# Patient Record
Sex: FEMALE | Race: OTHER | NOT HISPANIC OR LATINO
[De-identification: names, ages, dates, MRNs, and addresses within clinical notes are randomized per-mention and may not be internally consistent; named-entity substitution may affect disease eponyms.]

---

## 2020-06-01 PROBLEM — Z00.00 ENCOUNTER FOR PREVENTIVE HEALTH EXAMINATION: Status: ACTIVE | Noted: 2020-06-01

## 2020-06-04 ENCOUNTER — APPOINTMENT (OUTPATIENT)
Dept: ENDOCRINOLOGY | Facility: CLINIC | Age: 65
End: 2020-06-04
Payer: COMMERCIAL

## 2020-06-04 DIAGNOSIS — Z82.49 FAMILY HISTORY OF ISCHEMIC HEART DISEASE AND OTHER DISEASES OF THE CIRCULATORY SYSTEM: ICD-10-CM

## 2020-06-04 DIAGNOSIS — A07.8 OTHER SPECIFIED PROTOZOAL INTESTINAL DISEASES: ICD-10-CM

## 2020-06-04 DIAGNOSIS — A90 DENGUE FEVER [CLASSICAL DENGUE]: ICD-10-CM

## 2020-06-04 DIAGNOSIS — E80.6 OTHER DISORDERS OF BILIRUBIN METABOLISM: ICD-10-CM

## 2020-06-04 DIAGNOSIS — J98.4 OTHER DISORDERS OF LUNG: ICD-10-CM

## 2020-06-04 PROCEDURE — 99205 OFFICE O/P NEW HI 60 MIN: CPT | Mod: 95

## 2020-06-04 NOTE — HISTORY OF PRESENT ILLNESS
[Home] : at home, [unfilled] , at the time of the visit. [Medical Office: ([facility])___] : at the medical office located in  [Verbal consent obtained from patient] : the patient, [unfilled] [FreeTextEntry1] : Ms. 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

## 2020-06-04 NOTE — CONSULT LETTER
[Dear  ___] : Dear  [unfilled], [Consult Letter:] : I had the pleasure of evaluating your patient, [unfilled]. [Please see my note below.] : Please see my note below. [Consult Closing:] : Thank you very much for allowing me to participate in the care of this patient.  If you have any questions, please do not hesitate to contact me. [Sincerely,] : Sincerely, [FreeTextEntry3] : Dhara Castellanos MD

## 2020-06-04 NOTE — REVIEW OF SYSTEMS
[Recent Weight Gain (___ Lbs)] : recent weight gain: [unfilled] lbs [Negative] : Heme/Lymph [Joint Pain] : joint pain [Back Pain] : back pain [Fatigue] : no fatigue

## 2020-06-26 LAB
ACTH SER-ACNC: 12.9 PG/ML
ALBUMIN SERPL ELPH-MCNC: 4.3 G/DL
ALP BLD-CCNC: 58 U/L
ALPHA SUBUNIT SERPL-MCNC: 0.48 NG/ML
ALT SERPL-CCNC: 69 U/L
ANION GAP SERPL CALC-SCNC: 12 MMOL/L
AST SERPL-CCNC: 60 U/L
BASOPHILS # BLD AUTO: 0.07 K/UL
BASOPHILS NFR BLD AUTO: 1 %
BILIRUB SERPL-MCNC: 0.3 MG/DL
BUN SERPL-MCNC: 18 MG/DL
CALCIUM SERPL-MCNC: 9.6 MG/DL
CHLORIDE SERPL-SCNC: 100 MMOL/L
CO2 SERPL-SCNC: 26 MMOL/L
CORTIS SERPL-MCNC: 6.3 UG/DL
CREAT SERPL-MCNC: 0.67 MG/DL
EOSINOPHIL # BLD AUTO: 0.49 K/UL
EOSINOPHIL NFR BLD AUTO: 7.2 %
ESTRADIOL SERPL-MCNC: <5 PG/ML
FERRITIN SERPL-MCNC: 39 NG/ML
FSH SERPL-MCNC: 61 IU/L
GH SERPL-MCNC: 2.2 NG/ML
GLUCOSE SERPL-MCNC: 102 MG/DL
HCT VFR BLD CALC: 40.5 %
HGB BLD-MCNC: 12.2 G/DL
IGF BP1 SERPL-MCNC: 65 NG/ML
IMM GRANULOCYTES NFR BLD AUTO: 0.3 %
IRON SATN MFR SERPL: 29 %
IRON SERPL-MCNC: 90 UG/DL
LH SERPL-ACNC: 25.2 IU/L
LYMPHOCYTES # BLD AUTO: 1.9 K/UL
LYMPHOCYTES NFR BLD AUTO: 28 %
MAN DIFF?: NORMAL
MCHC RBC-ENTMCNC: 29.3 PG
MCHC RBC-ENTMCNC: 30.1 GM/DL
MCV RBC AUTO: 97.4 FL
MONOCYTES # BLD AUTO: 0.7 K/UL
MONOCYTES NFR BLD AUTO: 10.3 %
NEUTROPHILS # BLD AUTO: 3.6 K/UL
NEUTROPHILS NFR BLD AUTO: 53.2 %
PLATELET # BLD AUTO: 237 K/UL
POTASSIUM SERPL-SCNC: 4.7 MMOL/L
PROLACTIN SERPL-MCNC: 7.5 NG/ML
PROT SERPL-MCNC: 7.4 G/DL
RBC # BLD: 4.16 M/UL
RBC # FLD: 13 %
SODIUM SERPL-SCNC: 138 MMOL/L
T4 FREE SERPL-MCNC: 1.2 NG/DL
TIBC SERPL-MCNC: 313 UG/DL
TSH SERPL-ACNC: 1.42 UIU/ML
UIBC SERPL-MCNC: 222 UG/DL
WBC # FLD AUTO: 6.78 K/UL

## 2020-06-29 ENCOUNTER — APPOINTMENT (OUTPATIENT)
Dept: ENDOCRINOLOGY | Facility: CLINIC | Age: 65
End: 2020-06-29
Payer: COMMERCIAL

## 2020-06-29 PROCEDURE — 99213 OFFICE O/P EST LOW 20 MIN: CPT | Mod: 95

## 2020-06-29 NOTE — REVIEW OF SYSTEMS
[Back Pain] : back pain [Recent Weight Gain (___ Lbs)] : recent weight gain: [unfilled] lbs [Joint Pain] : joint pain [Negative] : Heme/Lymph [Fatigue] : no fatigue

## 2020-06-29 NOTE — HISTORY OF PRESENT ILLNESS
[Verbal consent obtained from patient] : the patient, [unfilled] [Home] : at home, [unfilled] , at the time of the visit. [Medical Office: ([facility])___] : at the medical office located in  [FreeTextEntry1] : Ms. 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

## 2020-06-29 NOTE — REVIEW OF SYSTEMS
[Recent Weight Gain (___ Lbs)] : recent weight gain: [unfilled] lbs [Back Pain] : back pain [Joint Pain] : joint pain [Negative] : Heme/Lymph [Fatigue] : no fatigue

## 2020-06-29 NOTE — HISTORY OF PRESENT ILLNESS
[Verbal consent obtained from patient] : the patient, [unfilled] [Home] : at home, [unfilled] , at the time of the visit. [Medical Office: ([facility])___] : at the medical office located in  [FreeTextEntry1] : Ms. 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

## 2020-07-01 VITALS — WEIGHT: 125 LBS | BODY MASS INDEX: 22.15 KG/M2 | HEIGHT: 63 IN

## 2020-07-06 ENCOUNTER — RESULT REVIEW (OUTPATIENT)
Age: 65
End: 2020-07-06

## 2020-07-08 ENCOUNTER — RESULT REVIEW (OUTPATIENT)
Age: 65
End: 2020-07-08

## 2020-07-30 ENCOUNTER — APPOINTMENT (OUTPATIENT)
Dept: ENDOCRINOLOGY | Facility: CLINIC | Age: 65
End: 2020-07-30
Payer: COMMERCIAL

## 2020-07-30 PROCEDURE — 99213 OFFICE O/P EST LOW 20 MIN: CPT | Mod: 95

## 2020-07-30 NOTE — REASON FOR VISIT
[Follow - Up] : a follow-up visit [Hypercalcemia] : hypercalcemia [Hyperparathyroidism] : hyperparathyroidism

## 2020-07-30 NOTE — HISTORY OF PRESENT ILLNESS
[Home] : at home, [unfilled] , at the time of the visit. [Medical Office: ([facility])___] : at the medical office located in  [Verbal consent obtained from patient] : the patient, [unfilled] [FreeTextEntry1] : Ms. 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

## 2020-08-04 ENCOUNTER — APPOINTMENT (OUTPATIENT)
Dept: HEMATOLOGY ONCOLOGY | Facility: CLINIC | Age: 65
End: 2020-08-04
Payer: COMMERCIAL

## 2020-08-04 ENCOUNTER — RESULT REVIEW (OUTPATIENT)
Age: 65
End: 2020-08-04

## 2020-08-04 VITALS
SYSTOLIC BLOOD PRESSURE: 89 MMHG | TEMPERATURE: 98.2 F | DIASTOLIC BLOOD PRESSURE: 55 MMHG | HEIGHT: 62.99 IN | BODY MASS INDEX: 21.97 KG/M2 | HEART RATE: 73 BPM | OXYGEN SATURATION: 97 % | RESPIRATION RATE: 18 BRPM | WEIGHT: 124 LBS

## 2020-08-04 DIAGNOSIS — Z86.19 PERSONAL HISTORY OF OTHER INFECTIOUS AND PARASITIC DISEASES: ICD-10-CM

## 2020-08-04 PROCEDURE — 99205 OFFICE O/P NEW HI 60 MIN: CPT

## 2020-08-04 RX ORDER — METRONIDAZOLE 500 MG/1
500 TABLET ORAL 3 TIMES DAILY
Refills: 0 | Status: DISCONTINUED | COMMUNITY
Start: 2020-06-04 | End: 2020-08-04

## 2020-08-04 NOTE — ASSESSMENT
[FreeTextEntry1] : Eosinophilia\par Intermittent\par DIscussed about differential diagnosis including primary (HES vs clonal) vs secondary (drugs, parasites, allergies, inflammations, cancers such as lymphoma\par She apparently had extensive work up in [place] which was negative\par She did have Blastocystis Hominis infeciton which can cause eosinophilia\par Repeat blood work including CBC, CMP, ESR, CRP, AP, IgE, SPEP, \par \par Iron deficiency\par Fatigue\par DIscussed IV vs PO iron\par SHe wants to try oral iron\par Scheduled for EGD and colonoscopy  August 31 with Dr. Aubrie Ansari. \par \par Follow up in 4 weeks (with reports of EGD and colonosocpy)\par No new labs

## 2020-08-04 NOTE — REVIEW OF SYSTEMS
[Constipation] : constipation [Negative] : Endocrine [FreeTextEntry4] : +pruritus on nose [FreeTextEntry1] : +allergies

## 2020-08-04 NOTE — CONSULT LETTER
[Dear  ___] : Dear  [unfilled], [Consult Letter:] : I had the pleasure of evaluating your patient, [unfilled]. [Please see my note below.] : Please see my note below. [Consult Closing:] : Thank you very much for allowing me to participate in the care of this patient.  If you have any questions, please do not hesitate to contact me. [Sincerely,] : Sincerely, [FreeTextEntry3] : Dwayne Garcia MD, MPH\par Attending Physician\par Hematology Oncology\par [facility] [place]\par [facility]\par  [DrTerence  ___] : Dr. PALMER

## 2020-08-04 NOTE — HISTORY OF PRESENT ILLNESS
[de-identified] : Ms. Arpit Levin is 64 year old [place] female referred by Dr. Monserrat Spann for eosinophila evaluation\par \par Patient with past medical history of hyperparathyroidism, hypercalcemia, Gerd, hypotension, and acquired eosinophila \par 6/5/20 wbc 6.78 hgb 12.2 hct 97.4  mcv 97.4 plts 237 Ferritin 39 Iron sat 29% Eosinophil absolute - 0.49\par 6/20/20 wbc 7.3 hgb 12.4 mcv 96.8  Abs Eos - 0.71\par \par Patient with chronic clear productive cough and advised to have endoscopy August 31 with Dr. Aubrie Ansari. Patient also has chronic allergies with itchiness eyes and nose year round. \par \par Patient with father with lung cancer (farmer and a smoker) at age 59.\par \par She feel tired and fatigued\par Attributes to low cortisol levels-  Cosyntropin stimulation test ruled out adrenal insufficiency\par \par She reports having ho high eosinophil counts - she had extensive work up in [place] including stool parasites etc which was negative\par

## 2020-09-04 ENCOUNTER — APPOINTMENT (OUTPATIENT)
Dept: PULMONOLOGY | Facility: CLINIC | Age: 65
End: 2020-09-04

## 2020-09-08 ENCOUNTER — APPOINTMENT (OUTPATIENT)
Dept: HEMATOLOGY ONCOLOGY | Facility: CLINIC | Age: 65
End: 2020-09-08
Payer: COMMERCIAL

## 2020-09-08 VITALS
HEART RATE: 75 BPM | TEMPERATURE: 97.3 F | DIASTOLIC BLOOD PRESSURE: 60 MMHG | SYSTOLIC BLOOD PRESSURE: 97 MMHG | RESPIRATION RATE: 18 BRPM | HEIGHT: 62.99 IN | OXYGEN SATURATION: 98 %

## 2020-09-08 PROCEDURE — 99214 OFFICE O/P EST MOD 30 MIN: CPT

## 2020-09-08 NOTE — HISTORY OF PRESENT ILLNESS
[de-identified] : Ms. Arpit Levin is 64 year old [place] female referred by Dr. Monserrat Spann for eosinophila evaluation\par \par Patient with past medical history of hyperparathyroidism, hypercalcemia, Gerd, hypotension, and acquired eosinophila \par 6/5/20 wbc 6.78 hgb 12.2 hct 97.4  mcv 97.4 plts 237 Ferritin 39 Iron sat 29% Eosinophil absolute - 0.49\par 6/20/20 wbc 7.3 hgb 12.4 mcv 96.8  Abs Eos - 0.71\par \par Patient with chronic clear productive cough and advised to have endoscopy August 31 with Dr. Aubrie Ansari. Patient also has chronic allergies with itchiness eyes and nose year round. \par \par Patient with father with lung cancer (farmer and a smoker) at age 59.\par \par She feel tired and fatigued\par Attributes to low cortisol levels-  Cosyntropin stimulation test ruled out adrenal insufficiency\par \par She reports having ho high eosinophil counts - she had extensive work up in [place] including stool parasites etc which was negative\par  [de-identified] : She is seen today for follow up\par \par She had colonoscopy (8/31/20) at [facility]\par Found to have 7 mm polyp cecum which was removed- path pending\par Also has diverticula, internal hemorrhoids\par Repeat colonoscopy in 3 years\par

## 2020-09-08 NOTE — RESULTS/DATA
[FreeTextEntry1] : Labs reviewed and discussed with the patient\par Eos 0.6\par \par The result is within normal limits for the ABL2, FIP1L1,\par CHIC2, PDGFRA, PDGFRB and ABL1 gene regions.\par \par IgE normal\par \par Stool for ova parasites- blastocystis

## 2020-09-08 NOTE — ASSESSMENT
[FreeTextEntry1] : Eosinophilia\par Intermittent\par Work up largely unremarkable except for stool showing blastocystis\par She apparently had extensive work up in [place] which was negative\par Mild eosinophilia - likely related to parasites or allergies\par MOnitor for now\par \par Mild Iron deficiency\par Fatigue\par Takes oral iron QOD\par s/p EGD and colonoscopy with Dr. Aubrie Ansari (8/2020)\par Follow pathology results\par \par Follow up in 6 months or sooner if needed\par CBC,. CMP, ferritin

## 2020-09-08 NOTE — CONSULT LETTER
[Dear  ___] : Dear  [unfilled], [Consult Letter:] : I had the pleasure of evaluating your patient, [unfilled]. [Please see my note below.] : Please see my note below. [Consult Closing:] : Thank you very much for allowing me to participate in the care of this patient.  If you have any questions, please do not hesitate to contact me. [Sincerely,] : Sincerely, [DrTerence  ___] : Dr. PALMER [FreeTextEntry3] : Dwayne Garcia MD, MPH\par Attending Physician\par Hematology Oncology\par [facility] [place]\par [facility]\par

## 2020-09-21 ENCOUNTER — APPOINTMENT (OUTPATIENT)
Dept: PULMONOLOGY | Facility: CLINIC | Age: 65
End: 2020-09-21
Payer: COMMERCIAL

## 2020-09-21 ENCOUNTER — LABORATORY RESULT (OUTPATIENT)
Age: 65
End: 2020-09-21

## 2020-09-21 VITALS
HEART RATE: 79 BPM | BODY MASS INDEX: 21.62 KG/M2 | SYSTOLIC BLOOD PRESSURE: 86 MMHG | OXYGEN SATURATION: 96 % | WEIGHT: 122 LBS | TEMPERATURE: 99.1 F | DIASTOLIC BLOOD PRESSURE: 50 MMHG | HEIGHT: 63 IN

## 2020-09-21 DIAGNOSIS — Z22.7 LATENT TUBERCULOSIS: ICD-10-CM

## 2020-09-21 DIAGNOSIS — J30.89 OTHER ALLERGIC RHINITIS: ICD-10-CM

## 2020-09-21 DIAGNOSIS — D72.1 EOSINOPHILIA: ICD-10-CM

## 2020-09-21 DIAGNOSIS — R93.89 ABNORMAL FINDINGS ON DIAGNOSTIC IMAGING OF OTHER SPECIFIED BODY STRUCTURES: ICD-10-CM

## 2020-09-21 LAB — EPWORTH SCORE: 13

## 2020-09-21 PROCEDURE — 99205 OFFICE O/P NEW HI 60 MIN: CPT

## 2020-09-29 LAB
A ALTERNATA IGE QN: <0.1 KUA/L
A ALTERNATA IGE QN: <0.1 KUA/L
A FUMIGATUS IGE QN: <0.1 KUA/L
A FUMIGATUS IGE QN: <0.1 KUA/L
BERMUDA GRASS IGE QN: <0.1 KUA/L
BOXELDER IGE QN: <0.1 KUA/L
C HERBARUM IGE QN: <0.1 KUA/L
C HERBARUM IGE QN: <0.1 KUA/L
CALIF WALNUT IGE QN: <0.1 KUA/L
CAT (RFEL D) 1 IGE QN: <0.1 KUA/L
CAT (RFEL D) 4 IGE QN: <0.1 KUA/L
CAT (RFEL D) 7 IGE QN: <0.1 KUA/L
CAT DANDER IGE QN: <0.1 KUA/L
CAT SERUM ALB IGE QN: <0.1 KUA/L
CMN PIGWEED IGE QN: <0.1 KUA/L
COMMON RAGWEED IGE QN: <0.1 KUA/L
COTTONWOOD IGE QN: <0.1 KUA/L
D FARINAE IGE QN: <0.1 KUA/L
D PTERONYSS IGE QN: <0.1 KUA/L
DEPRECATED A ALTERNATA IGE RAST QL: 0
DEPRECATED A ALTERNATA IGE RAST QL: 0
DEPRECATED A FUMIGATUS IGE RAST QL: 0
DEPRECATED A FUMIGATUS IGE RAST QL: 0
DEPRECATED BERMUDA GRASS IGE RAST QL: 0
DEPRECATED BOXELDER IGE RAST QL: 0
DEPRECATED C HERBARUM IGE RAST QL: 0
DEPRECATED C HERBARUM IGE RAST QL: 0
DEPRECATED CAT (RFEL D) 1 IGE RAST QL: 0 (ref 0–?)
DEPRECATED CAT (RFEL D) 4 IGE RAST QL: 0 (ref 0–?)
DEPRECATED CAT (RFEL D) 7 IGE RAST QL: 0 (ref 0–?)
DEPRECATED CAT DANDER IGE RAST QL: 0
DEPRECATED CAT SERUM ALB IGE RAST QL: 0 (ref 0–?)
DEPRECATED COMMON PIGWEED IGE RAST QL: 0
DEPRECATED COMMON RAGWEED IGE RAST QL: 0
DEPRECATED COTTONWOOD IGE RAST QL: 0
DEPRECATED D FARINAE IGE RAST QL: 0
DEPRECATED D PTERONYSS IGE RAST QL: 0
DEPRECATED DOG (RCAN F) 1 IGE RAST QL: 0 (ref 0–?)
DEPRECATED DOG (RCAN F) 2 IGE RAST QL: 0 (ref 0–?)
DEPRECATED DOG (RCAN F) 4 IGE RAST QL: 0 (ref 0–?)
DEPRECATED DOG (RCAN F) 5 IGE RAST QL: 0 (ref 0–?)
DEPRECATED DOG (RCAN F) 6 IGE RAST QL: 0 (ref 0–?)
DEPRECATED DOG DANDER IGE RAST QL: 0
DEPRECATED DOG SERUM ALB IGE RAST QL: 0 (ref 0–?)
DEPRECATED GOOSEFOOT IGE RAST QL: 0
DEPRECATED LONDON PLANE IGE RAST QL: 0
DEPRECATED MUGWORT IGE RAST QL: 0
DEPRECATED P NOTATUM IGE RAST QL: 0
DEPRECATED P NOTATUM IGE RAST QL: 0
DEPRECATED RED CEDAR IGE RAST QL: 0
DEPRECATED ROACH IGE RAST QL: 0
DEPRECATED S ROSTRATA IGE RAST QL: 0
DEPRECATED SHEEP SORREL IGE RAST QL: 0
DEPRECATED SILVER BIRCH IGE RAST QL: 0
DEPRECATED TIMOTHY IGE RAST QL: 0
DEPRECATED WHITE ASH IGE RAST QL: 0
DEPRECATED WHITE OAK IGE RAST QL: 0
DOG (RCAN F) 1 IGE QN: <0.1 KUA/L
DOG (RCAN F) 2 IGE QN: <0.1 KUA/L
DOG (RCAN F) 4 IGE QN: <0.1 KUA/L
DOG (RCAN F) 5 IGE QN: <0.1 KUA/L
DOG (RCAN F) 6 IGE QN: <0.1 KUA/L
DOG DANDER IGE QN: <0.1 KUA/L
DOG SERUM ALB IGE QN: <0.1 KUA/L
GOOSEFOOT IGE QN: <0.1 KUA/L
HORSE (REQU C) 1 IGE QN: 0 (ref 0–?)
HORSE REQU C 1 IGE E227 CONC: <0.1 KUA/L
LONDON PLANE IGE QN: <0.1 KUA/L
M TB IFN-G BLD-IMP: NEGATIVE
MUGWORT IGE QN: <0.1 KUA/L
MULBERRY (T70) CLASS: 0
MULBERRY (T70) CONC: <0.1 KUA/L
P NOTATUM IGE QN: <0.1 KUA/L
P NOTATUM IGE QN: <0.1 KUA/L
QUANTIFERON TB PLUS MITOGEN MINUS NIL: 5.56 IU/ML
QUANTIFERON TB PLUS NIL: 0.02 IU/ML
QUANTIFERON TB PLUS TB1 MINUS NIL: 0 IU/ML
QUANTIFERON TB PLUS TB2 MINUS NIL: 0.01 IU/ML
RED CEDAR IGE QN: <0.1 KUA/L
ROACH IGE QN: <0.1 KUA/L
S ROSTRATA IGE QN: <0.1 KUA/L
SHEEP SORREL IGE QN: <0.1 KUA/L
SILVER BIRCH IGE QN: <0.1 KUA/L
TIMOTHY IGE QN: <0.1 KUA/L
TOTAL IGE SMQN RAST: 2 KU/L
TREE ALLERG MIX1 IGE QL: 0
WHITE ASH IGE QN: <0.1 KUA/L
WHITE ELM IGE QN: 0
WHITE ELM IGE QN: <0.1 KUA/L
WHITE OAK IGE QN: <0.1 KUA/L

## 2020-10-01 PROBLEM — Z22.7 LATENT TUBERCULOSIS: Status: ACTIVE | Noted: 2020-09-21

## 2020-10-01 NOTE — PHYSICAL EXAM
[No Acute Distress] : no acute distress [Low Lying Soft Palate] : low lying soft palate [Elongated Uvula] : elongated uvula [Erythema] : erythema [Turbinate hypertrophy] : turbinate hypertrophy [III] : Mallampati Class: III [Normal Appearance] : normal appearance [No Neck Mass] : no neck mass [Normal Rate/Rhythm] : normal rate/rhythm [Normal S1, S2] : normal s1, s2 [No Murmurs] : no murmurs [No Resp Distress] : no resp distress [Clear to Auscultation Bilaterally] : clear to auscultation bilaterally [No Abnormalities] : no abnormalities [Benign] : benign [Normal Gait] : normal gait [No Clubbing] : no clubbing [No Cyanosis] : no cyanosis [No Edema] : no edema [FROM] : FROM [Normal Color/ Pigmentation] : normal color/ pigmentation [No Focal Deficits] : no focal deficits [Oriented x3] : oriented x3 [Normal Affect] : normal affect

## 2020-10-01 NOTE — REVIEW OF SYSTEMS
[Postnasal Drip] : postnasal drip [Dry Mouth] : dry mouth [Cough] : cough [A.M. Dry Mouth] : a.m. dry mouth [GERD] : gerd [Depression] : depression [Anxiety] : anxiety [Negative] : Endocrine [TextBox_30] : for 20 years

## 2020-10-01 NOTE — DISCUSSION/SUMMARY
[FreeTextEntry1] : Chest CT without contrast report only performed at [facility] 2/1/2020 mild bilateral apical pleural thickening R slightly>L. 0.3 cm Ca++ granuloma no interstitial lung disease

## 2020-10-01 NOTE — REASON FOR VISIT
[Initial] : an initial visit [Cough] : cough [Abnormal CXR/ Chest CT] : an abnormal CXR/ chest CT [TextBox_44] : for about 20 years

## 2020-10-01 NOTE — HISTORY OF PRESENT ILLNESS
[Former] : former [< 30 pack-years] : < 30 pack-years [Never] : never [TextBox_4] : I was asked to consult on this patient by Dr. Spann for an abnormal chest CT and cough\par The patient is a 64yo [place]og[place] woman who appears much younger than her stated age smoked 1/3 PPD x 20 years and quit 23 years ago [place]  and had no prior pulmonary issues as a child or early adulthood. For years before she came to the country she noticed an increase in cough which got worse since she arrived in the [place]. She was started on omeprazole which helped but she was still coughing but her GERD improved she was diagnosed with a ventral hernia and had an endoscopy 10 years ago. Since then she's elevated head of bed and eat 3 hours before going to sleep and the cough decreased still coughs in the morning with phlegm usually white and she denies yellow green brown or purulent phlegm. 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 She does notice her cough increases when she vacuums and during the spring. She does have a cat but was not allergic when she was tested. Her first  was a heavy smoker (2-3 PPD x 10 years) and  of lung cancer. She's unsure if she has allergies she does have sinus headaches nasal congestion and clear nasal discharge. She denies nausea vomiting but does have occasional night sweats. ? menopause x10 years with feeling cold and hot. + C/O B knee pain improved with stretching. She is tired during the day. She had a chest CT scan there was concern with the results. [TextBox_11] : 1/3 [TextBox_13] : 20 [YearQuit] : [id number] [Obstructive Sleep Apnea] : obstructive sleep apnea

## 2020-12-16 ENCOUNTER — APPOINTMENT (OUTPATIENT)
Dept: GASTROENTEROLOGY | Facility: CLINIC | Age: 65
End: 2020-12-16
Payer: COMMERCIAL

## 2020-12-16 VITALS
DIASTOLIC BLOOD PRESSURE: 62 MMHG | HEIGHT: 63 IN | WEIGHT: 122 LBS | SYSTOLIC BLOOD PRESSURE: 90 MMHG | BODY MASS INDEX: 21.62 KG/M2

## 2020-12-16 DIAGNOSIS — Z86.2 PERSONAL HISTORY OF DISEASES OF THE BLOOD AND BLOOD-FORMING ORGANS AND CERTAIN DISORDERS INVOLVING THE IMMUNE MECHANISM: ICD-10-CM

## 2020-12-16 PROCEDURE — 99204 OFFICE O/P NEW MOD 45 MIN: CPT

## 2020-12-16 PROCEDURE — 99072 ADDL SUPL MATRL&STAF TM PHE: CPT

## 2020-12-17 PROBLEM — Z86.2 HISTORY OF EOSINOPHILIA: Status: ACTIVE | Noted: 2020-08-04

## 2020-12-17 NOTE — ASSESSMENT
[FreeTextEntry1] : GERD/LPR- increase omeprazole to 40 mg daily\par add pepcid at night\par -EGD with biopsy, r/o eosinophilic esophagitis \par \par h/o abnormal LFTS- patient reports prior evaluation by other gi physicians and liver bx in [place]. \par Referred her to hepatologist, Sonja Zuñiga, for further evaluation.

## 2020-12-17 NOTE — PHYSICAL EXAM
[General Appearance - Alert] : alert [General Appearance - In No Acute Distress] : in no acute distress [Sclera] : the sclera and conjunctiva were normal [Outer Ear] : the ears and nose were normal in appearance [Neck Appearance] : the appearance of the neck was normal [] : no respiratory distress [Apical Impulse] : the apical impulse was normal [Abdomen Soft] : soft [Abdomen Tenderness] : non-tender [Abnormal Walk] : normal gait [Skin Color & Pigmentation] : normal skin color and pigmentation [Cranial Nerves] : cranial nerves 2-12 were intact [Oriented To Time, Place, And Person] : oriented to person, place, and time [FreeTextEntry1] : deferred

## 2020-12-17 NOTE — HISTORY OF PRESENT ILLNESS
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 She is seen at the request of Dr. Bravo. \par She has had it her whole life. \par cough is worse  x4-5 years. worse at night and when she wakes up in the am. \par she has taken PPI x 6 months, which is helpful, 70 % helpful. \par she takes it daily. \par cough is mildly productive\par \par record review\par Colonoscopy 8/31/20- Dr. Aubrie Bolden- \par 7 mm cecal polyp, path hyperplastic\par diverticula- \par internal hemorrhoids\par recall in 3 years. \par \par EGD 4/23/2013 Dr. Pantoja- 1-2 cm HH, mild gastric erythema, neg HP\par \par Colonoscopy 11/7/2011-+ FIT Dr. Pantoja hemorrhoids\par \par 07/21/2020- blastocystis\par \par US 6/5/18-hepatic cysts, left renal cyst and angiomyolipoma, adenomyomatosis\par \par Abd US 11/22/2011- right liver cyst\par \par Pelvic US 11/22/201--s/p DONNA BSO,no mass or cyst\par \par [place]oplasm[place] infiltr[place] with periportal lobular activity associated to moderate eosinophilia, possible dx can be medication induced hepatotoxicity or another etiology associated to autoimmune hepatitis or vice-verse., close correlation of is recommended also ruling out parasitic disease. \par \par labs notable for 08/2020 / Alt 131\par 06/2020 AST 60 ALT 69\par PMD: Dr Spann

## 2021-02-22 ENCOUNTER — RESULT REVIEW (OUTPATIENT)
Age: 66
End: 2021-02-22

## 2021-02-24 ENCOUNTER — RESULT REVIEW (OUTPATIENT)
Age: 66
End: 2021-02-24

## 2021-02-25 ENCOUNTER — APPOINTMENT (OUTPATIENT)
Dept: GASTROENTEROLOGY | Facility: HOSPITAL | Age: 66
End: 2021-02-25

## 2021-03-04 DIAGNOSIS — E61.1 IRON DEFICIENCY: ICD-10-CM

## 2021-03-09 ENCOUNTER — APPOINTMENT (OUTPATIENT)
Dept: HEMATOLOGY ONCOLOGY | Facility: CLINIC | Age: 66
End: 2021-03-09

## 2021-06-02 ENCOUNTER — APPOINTMENT (OUTPATIENT)
Dept: ENDOCRINOLOGY | Facility: CLINIC | Age: 66
End: 2021-06-02
Payer: COMMERCIAL

## 2021-06-02 VITALS
SYSTOLIC BLOOD PRESSURE: 110 MMHG | HEART RATE: 74 BPM | WEIGHT: 127 LBS | DIASTOLIC BLOOD PRESSURE: 60 MMHG | BODY MASS INDEX: 22.5 KG/M2 | RESPIRATION RATE: 17 BRPM | HEIGHT: 63 IN | OXYGEN SATURATION: 98 %

## 2021-06-02 LAB
25(OH)D3 SERPL-MCNC: 22.6 NG/ML
ALBUMIN SERPL ELPH-MCNC: 4.1 G/DL
ALP BLD-CCNC: 55 U/L
ALT SERPL-CCNC: 157 U/L
ANION GAP SERPL CALC-SCNC: 12 MMOL/L
AST SERPL-CCNC: 114 U/L
BILIRUB SERPL-MCNC: 0.2 MG/DL
BUN SERPL-MCNC: 21 MG/DL
CALCIUM SERPL-MCNC: 9.3 MG/DL
CALCIUM SERPL-MCNC: 9.3 MG/DL
CHLORIDE SERPL-SCNC: 101 MMOL/L
CO2 SERPL-SCNC: 27 MMOL/L
CREAT SERPL-MCNC: 0.73 MG/DL
GLUCOSE SERPL-MCNC: 95 MG/DL
MAGNESIUM SERPL-MCNC: 2.2 MG/DL
PARATHYROID HORMONE INTACT: 52 PG/ML
POTASSIUM SERPL-SCNC: 4.5 MMOL/L
PROT SERPL-MCNC: 7.4 G/DL
SODIUM SERPL-SCNC: 140 MMOL/L
TSH SERPL-ACNC: 1.28 UIU/ML

## 2021-06-02 PROCEDURE — 99215 OFFICE O/P EST HI 40 MIN: CPT

## 2021-06-02 RX ORDER — ERGOCALCIFEROL 1.25 MG/1
1.25 MG CAPSULE, LIQUID FILLED ORAL
Qty: 4 | Refills: 0 | Status: DISCONTINUED | COMMUNITY
Start: 2020-04-22 | End: 2021-06-02

## 2021-06-02 NOTE — ASSESSMENT
[Bisphosphonate Therapy] : Risks and benefits of bisphosphonate therapy were  discussed with the patient including gastroesophageal irritation, osteonecrosis of the jaw, and atypical femur fractures, and acute phase reaction [Bisphosphonates] : The patient was instructed to take bisphosphonates on an empty stomach with a full glass of water,and wait at least 30 minutes before eating or lying down

## 2021-06-02 NOTE — HISTORY OF PRESENT ILLNESS
[Family History of Osteoporosis] : family history of osteoporosis [None] : The patient is currently asymptomatic [Excessive Alcohol Intake] : no excessive alcohol intake [Excessive Soda] : no excessive soda [Sedentary] : no sedentary lifestyle [Current Smoking___(ppd)] : not currently smoking [Previous Fragility Fracture] : no previous fragility fracture [Regular Dental Follow-Up] : no regular dental follow-up [FreeTextEntry1] : Ms. FARHAN ROSALES is a 65 year old female\par TAHBO at 52 yo benign mass \par last DEXA 3/11/2021 LS -2.3\par Femural neck -2.5\par \par no calcium and Vit D \par \par was told blood in the urine will see Urology test was 4/2021 [Low Calcium Intake] : no low calcium intake [Low Vit D Intake/Exposure] : no low vitamin D intake [Premat. Menopause (natural)] : no history of premature menopause [Premat. Menopause (surgery)] : no history of premature surgical menopause [Amenorrhea] : no amenorrhea [Disordered Eating] : no history of disordered eating [Taking Steroids] : no history of taking steroids [Hyperparathyroidism] : no history of hyperparathyroidism [Diabetes] : no history of diabetes [Kidney Stones] : no history of kidney stones [Family History of Breast Cancer] : no family history of breast cancer [Family History of Hip Fracture] : no family history of hip fracture [History of Radiation Therapy] : no history of radiation therapy [History of Blood Clots] : no history of blood clots [High Fall Risk] : no fall risk [Frequent Falls] : no frequent falls [Uses a Walker/Cane] : no use of a walker/cane [Inability to Stand Straight] : no inability to stand straight [Loss of Height] : no loss of height [Loss of Balance] : no loss of balance [Change in Clothing Fit] : no change in clothing fit [Weight Gain] : no weight gain [Difficulty Ambulating] : no difficulty ambulating [Hip Pain] : no hip pain [Wrist Pain] : no wrist pain [Difficulty with Stairs] : no difficulty with stairs [Arthralgias] : no arthralgias [Myalgias] : no myalgias [New Fracture] : no new fracture

## 2021-09-15 ENCOUNTER — LABORATORY RESULT (OUTPATIENT)
Age: 66
End: 2021-09-15

## 2021-09-17 ENCOUNTER — APPOINTMENT (OUTPATIENT)
Dept: ENDOCRINOLOGY | Facility: CLINIC | Age: 66
End: 2021-09-17
Payer: COMMERCIAL

## 2021-09-17 VITALS
HEART RATE: 78 BPM | OXYGEN SATURATION: 98 % | DIASTOLIC BLOOD PRESSURE: 70 MMHG | WEIGHT: 125 LBS | HEIGHT: 63 IN | BODY MASS INDEX: 22.15 KG/M2 | SYSTOLIC BLOOD PRESSURE: 102 MMHG

## 2021-09-17 DIAGNOSIS — I95.9 HYPOTENSION, UNSPECIFIED: ICD-10-CM

## 2021-09-17 LAB
24R-OH-CALCIDIOL SERPL-MCNC: 67.7 PG/ML
25(OH)D3 SERPL-MCNC: 42 NG/ML
ALBUMIN SERPL ELPH-MCNC: 4.7 G/DL
ALP BLD-CCNC: 57 U/L
ALT SERPL-CCNC: 159 U/L
ANION GAP SERPL CALC-SCNC: 8 MMOL/L
AST SERPL-CCNC: 109 U/L
BILIRUB SERPL-MCNC: 0.6 MG/DL
BUN SERPL-MCNC: 14 MG/DL
CALCIUM SERPL-MCNC: 9.8 MG/DL
CALCIUM SERPL-MCNC: 9.8 MG/DL
CHLORIDE SERPL-SCNC: 100 MMOL/L
CO2 SERPL-SCNC: 29 MMOL/L
CREAT SERPL-MCNC: 0.7 MG/DL
GLUCOSE SERPL-MCNC: 89 MG/DL
MAGNESIUM SERPL-MCNC: 2.2 MG/DL
PARATHYROID HORMONE INTACT: 37 PG/ML
PHOSPHATE SERPL-MCNC: 3.9 MG/DL
POTASSIUM SERPL-SCNC: 4.5 MMOL/L
PROT SERPL-MCNC: 8.1 G/DL
SODIUM SERPL-SCNC: 137 MMOL/L
TSH SERPL-ACNC: 1.56 UIU/ML

## 2021-09-17 PROCEDURE — 99215 OFFICE O/P EST HI 40 MIN: CPT

## 2021-09-17 NOTE — HISTORY OF PRESENT ILLNESS
[Family History of Osteoporosis] : family history of osteoporosis [FreeTextEntry1] : Ms. FARHAN ROSALES is a 65 year old female\par TAHBO at 52 yo benign mass \par last DEXA 3/11/2021 LS -2.3\par Femural neck -2.5\par \par no calcium and Vit D \par \par was told blood in the urine will see Urology test was 4/2021\par \par on ETNA from her country for a dental implant 8/15/21 in [place] (trifosfato Trssodica de uridina 1.5mg 3 times a day per her dentist  [Low Calcium Intake] : no low calcium intake [Low Vit D Intake/Exposure] : no low vitamin D intake [Premat. Menopause (natural)] : no history of premature menopause [Premat. Menopause (surgery)] : no history of premature surgical menopause [Amenorrhea] : no amenorrhea [Disordered Eating] : no history of disordered eating [Taking Steroids] : no history of taking steroids [Hyperparathyroidism] : no history of hyperparathyroidism [Diabetes] : no history of diabetes [Kidney Stones] : no history of kidney stones [Family History of Breast Cancer] : no family history of breast cancer [Family History of Hip Fracture] : no family history of hip fracture [History of Radiation Therapy] : no history of radiation therapy [History of Blood Clots] : no history of blood clots [High Fall Risk] : no fall risk [Frequent Falls] : no frequent falls [Uses a Walker/Cane] : no use of a walker/cane [Inability to Stand Straight] : no inability to stand straight [Loss of Height] : no loss of height [Loss of Balance] : no loss of balance [Change in Clothing Fit] : no change in clothing fit [Weight Gain] : no weight gain [Difficulty Ambulating] : no difficulty ambulating [Hip Pain] : no hip pain [Wrist Pain] : no wrist pain [Difficulty with Stairs] : no difficulty with stairs [Arthralgias] : no arthralgias [Myalgias] : no myalgias [New Fracture] : no new fracture

## 2021-09-17 NOTE — REVIEW OF SYSTEMS
[Myalgia] : myalgia  [Negative] : Heme/Lymph [Palpitations] : palpitations [Cough] : cough [Polyuria] : polyuria [Fatigue] : no fatigue [FreeTextEntry3] : hoarseness  [FreeTextEntry5] : seen Cardiology 10yrs ago last timeonce in a while per pt  [FreeTextEntry7] : blood in the urine seen Urology will have Cystoscopy  [de-identified] : very low BP per pt fainting in the past per pt

## 2021-10-27 LAB
ALBUMIN MFR SERPL ELPH: 53.4 %
ALBUMIN SERPL-MCNC: 4.2 G/DL
ALBUMIN/GLOB SERPL: 1.2 RATIO
ALPHA1 GLOB MFR SERPL ELPH: 3.3 %
ALPHA1 GLOB SERPL ELPH-MCNC: 0.3 G/DL
ALPHA2 GLOB MFR SERPL ELPH: 9.3 %
ALPHA2 GLOB SERPL ELPH-MCNC: 0.7 G/DL
B-GLOBULIN MFR SERPL ELPH: 11.5 %
B-GLOBULIN SERPL ELPH-MCNC: 0.9 G/DL
COLLAGEN NTX UR-SCNC: 112 NMOL BCE
COLLAGEN NTX/CREAT UR-SRTO: 28
CREAT UR-MCNC: 44.8 MG/DL
GAMMA GLOB FLD ELPH-MCNC: 1.8 G/DL
GAMMA GLOB MFR SERPL ELPH: 22.5 %
INTERPRETATION SERPL IEP-IMP: NORMAL
INTERPRETIVE GUIDE:: NORMAL
PROT SERPL-MCNC: 7.8 G/DL
PROT SERPL-MCNC: 7.8 G/DL

## 2022-02-22 ENCOUNTER — APPOINTMENT (OUTPATIENT)
Dept: VASCULAR SURGERY | Facility: CLINIC | Age: 67
End: 2022-02-22

## 2022-03-15 ENCOUNTER — LABORATORY RESULT (OUTPATIENT)
Age: 67
End: 2022-03-15

## 2022-03-16 ENCOUNTER — APPOINTMENT (OUTPATIENT)
Dept: ENDOCRINOLOGY | Facility: CLINIC | Age: 67
End: 2022-03-16
Payer: COMMERCIAL

## 2022-03-16 VITALS
DIASTOLIC BLOOD PRESSURE: 70 MMHG | OXYGEN SATURATION: 98 % | HEART RATE: 81 BPM | WEIGHT: 123 LBS | HEIGHT: 63 IN | SYSTOLIC BLOOD PRESSURE: 104 MMHG | BODY MASS INDEX: 21.79 KG/M2

## 2022-03-16 PROCEDURE — 99214 OFFICE O/P EST MOD 30 MIN: CPT

## 2022-03-16 RX ORDER — ERGOCALCIFEROL 1.25 MG/1
1.25 MG CAPSULE ORAL
Qty: 13 | Refills: 1 | Status: DISCONTINUED | COMMUNITY
Start: 2021-06-02 | End: 2022-03-16

## 2022-03-16 RX ORDER — TINIDAZOLE 500 MG/1
500 TABLET, FILM COATED ORAL
Qty: 4 | Refills: 0 | Status: DISCONTINUED | COMMUNITY
Start: 2020-08-21 | End: 2022-03-16

## 2022-04-22 NOTE — REVIEW OF SYSTEMS
[Palpitations] : palpitations [Cough] : cough [Polyuria] : polyuria [Myalgia] : myalgia  [Negative] : Heme/Lymph [Fatigue] : no fatigue [FreeTextEntry3] : hoarseness  [FreeTextEntry5] : seen Cardiology 10yrs ago last timeonce in a while per pt  [FreeTextEntry7] : blood in the urine seen Urology will have Cystoscopy  [de-identified] : very low BP per pt fainting in the past per pt

## 2022-04-22 NOTE — HISTORY OF PRESENT ILLNESS
[Family History of Osteoporosis] : family history of osteoporosis [FreeTextEntry1] : Ms. FARHAN ROSALES is a 66 year old female\par TAHBO at 50 yo benign mass \par last DEXA 3/11/2021 LS -2.3\par Femural neck -2.5\par \par no calcium and Vit D \par \par was told blood in the urine will see Urology test was 4/2021\par \par on ETNA from her country for a dental implant 8/15/21 in [place] (trifosfato Trssodica de uridina 1.5mg 3 times a day per her dentist  [Low Calcium Intake] : no low calcium intake [Low Vit D Intake/Exposure] : no low vitamin D intake [Premat. Menopause (natural)] : no history of premature menopause [Premat. Menopause (surgery)] : no history of premature surgical menopause [Amenorrhea] : no amenorrhea [Disordered Eating] : no history of disordered eating [Taking Steroids] : no history of taking steroids [Hyperparathyroidism] : no history of hyperparathyroidism [Diabetes] : no history of diabetes [Kidney Stones] : no history of kidney stones [Family History of Breast Cancer] : no family history of breast cancer [Family History of Hip Fracture] : no family history of hip fracture [History of Radiation Therapy] : no history of radiation therapy [History of Blood Clots] : no history of blood clots [High Fall Risk] : no fall risk [Frequent Falls] : no frequent falls [Uses a Walker/Cane] : no use of a walker/cane [Inability to Stand Straight] : no inability to stand straight [Loss of Height] : no loss of height [Loss of Balance] : no loss of balance [Change in Clothing Fit] : no change in clothing fit [Weight Gain] : no weight gain [Difficulty Ambulating] : no difficulty ambulating [Hip Pain] : no hip pain [Wrist Pain] : no wrist pain [Difficulty with Stairs] : no difficulty with stairs [Arthralgias] : no arthralgias [Myalgias] : no myalgias [New Fracture] : no new fracture

## 2022-05-16 ENCOUNTER — RESULT REVIEW (OUTPATIENT)
Age: 67
End: 2022-05-16

## 2022-07-22 LAB
24R-OH-CALCIDIOL SERPL-MCNC: 57.8 PG/ML
25(OH)D3 SERPL-MCNC: 38.2 NG/ML
ALBUMIN SERPL ELPH-MCNC: 4.1 G/DL
ALP BLD-CCNC: 40 U/L
ALT SERPL-CCNC: 57 U/L
ANION GAP SERPL CALC-SCNC: 7 MMOL/L
AST SERPL-CCNC: 55 U/L
BILIRUB SERPL-MCNC: 0.5 MG/DL
BUN SERPL-MCNC: 18 MG/DL
CALCIUM SERPL-MCNC: 9.4 MG/DL
CALCIUM SERPL-MCNC: 9.4 MG/DL
CHLORIDE SERPL-SCNC: 102 MMOL/L
CO2 SERPL-SCNC: 28 MMOL/L
CREAT SERPL-MCNC: 0.66 MG/DL
EGFR: 98 ML/MIN/1.73M2
GLUCOSE SERPL-MCNC: 88 MG/DL
MAGNESIUM SERPL-MCNC: 2.1 MG/DL
PARATHYROID HORMONE INTACT: 60 PG/ML
PHOSPHATE SERPL-MCNC: 3.3 MG/DL
POTASSIUM SERPL-SCNC: 4.7 MMOL/L
PROT SERPL-MCNC: 7.3 G/DL
SODIUM SERPL-SCNC: 138 MMOL/L
TSH SERPL-ACNC: 1.36 UIU/ML

## 2022-07-25 ENCOUNTER — APPOINTMENT (OUTPATIENT)
Dept: ENDOCRINOLOGY | Facility: CLINIC | Age: 67
End: 2022-07-25

## 2022-07-25 VITALS
WEIGHT: 119 LBS | SYSTOLIC BLOOD PRESSURE: 108 MMHG | DIASTOLIC BLOOD PRESSURE: 60 MMHG | HEIGHT: 63 IN | HEART RATE: 71 BPM | OXYGEN SATURATION: 98 % | BODY MASS INDEX: 21.09 KG/M2

## 2022-07-25 DIAGNOSIS — E27.40 UNSPECIFIED ADRENOCORTICAL INSUFFICIENCY: ICD-10-CM

## 2022-07-25 LAB
ALBUMIN MFR SERPL ELPH: 54.3 %
ALBUMIN SERPL-MCNC: 4 G/DL
ALBUMIN/GLOB SERPL: 1.2 RATIO
ALPHA1 GLOB MFR SERPL ELPH: 3.6 %
ALPHA1 GLOB SERPL ELPH-MCNC: 0.3 G/DL
ALPHA2 GLOB MFR SERPL ELPH: 10 %
ALPHA2 GLOB SERPL ELPH-MCNC: 0.7 G/DL
B-GLOBULIN MFR SERPL ELPH: 11.4 %
B-GLOBULIN SERPL ELPH-MCNC: 0.8 G/DL
GAMMA GLOB FLD ELPH-MCNC: 1.5 G/DL
GAMMA GLOB MFR SERPL ELPH: 20.7 %
INTERPRETATION SERPL IEP-IMP: NORMAL
PROT SERPL-MCNC: 7.3 G/DL
PROT SERPL-MCNC: 7.3 G/DL

## 2022-07-25 PROCEDURE — 99215 OFFICE O/P EST HI 40 MIN: CPT

## 2022-07-25 RX ORDER — PSYLLIUM HUSK 0.4 G
CAPSULE ORAL
Refills: 0 | Status: ACTIVE | COMMUNITY

## 2022-07-25 NOTE — REVIEW OF SYSTEMS
[Palpitations] : palpitations [Cough] : cough [Polyuria] : polyuria [Myalgia] : myalgia  [Negative] : Heme/Lymph [Fatigue] : no fatigue [Recent Weight Loss (___ Lbs)] : recent weight loss: [unfilled] lbs [FreeTextEntry2] : on glute free diet helps a lot per pt [FreeTextEntry3] : hoarseness  [FreeTextEntry5] : seen Cardiology 10yrs ago last timeonce in a while per pt  [FreeTextEntry7] : blood in the urine seen Urology will have Cystoscopy  [de-identified] : very low BP per pt fainting in the past per pt

## 2022-07-25 NOTE — HISTORY OF PRESENT ILLNESS
[Family History of Osteoporosis] : family history of osteoporosis [FreeTextEntry1] : Ms. FARHAN ROSALES is a 66 year old female\par TAHBO at 50 yo benign mass \par most recent March 2022 implant done in [place] \par last DEXA 3/11/2021 LS -2.3\par Femural neck -2.5\par \par no calcium and Vit D \par \par was told blood in the urine will see Urology test was 4/2021\par \par on ETNA from her country for a dental implant 8/15/21 in [place] (trifosfato Trssodica de uridina 1.5mg 3 times a day per her dentist  [Low Calcium Intake] : no low calcium intake [Low Vit D Intake/Exposure] : no low vitamin D intake [Premat. Menopause (natural)] : no history of premature menopause [Premat. Menopause (surgery)] : no history of premature surgical menopause [Amenorrhea] : no amenorrhea [Disordered Eating] : no history of disordered eating [Taking Steroids] : no history of taking steroids [Hyperparathyroidism] : no history of hyperparathyroidism [Diabetes] : no history of diabetes [Kidney Stones] : no history of kidney stones [Family History of Breast Cancer] : no family history of breast cancer [Family History of Hip Fracture] : no family history of hip fracture [History of Radiation Therapy] : no history of radiation therapy [History of Blood Clots] : no history of blood clots [High Fall Risk] : no fall risk [Frequent Falls] : no frequent falls [Uses a Walker/Cane] : no use of a walker/cane [Inability to Stand Straight] : no inability to stand straight [Loss of Height] : no loss of height [Loss of Balance] : no loss of balance [Change in Clothing Fit] : no change in clothing fit [Weight Gain] : no weight gain [Difficulty Ambulating] : no difficulty ambulating [Hip Pain] : no hip pain [Wrist Pain] : no wrist pain [Difficulty with Stairs] : no difficulty with stairs [Arthralgias] : no arthralgias [Myalgias] : no myalgias [New Fracture] : no new fracture

## 2022-11-01 ENCOUNTER — APPOINTMENT (OUTPATIENT)
Dept: GERIATRICS | Facility: CLINIC | Age: 67
End: 2022-11-01

## 2022-11-01 VITALS
TEMPERATURE: 97.9 F | HEART RATE: 95 BPM | BODY MASS INDEX: 20.73 KG/M2 | DIASTOLIC BLOOD PRESSURE: 60 MMHG | OXYGEN SATURATION: 98 % | SYSTOLIC BLOOD PRESSURE: 90 MMHG | WEIGHT: 117 LBS

## 2022-11-01 DIAGNOSIS — F32.A DEPRESSION, UNSPECIFIED: ICD-10-CM

## 2022-11-01 DIAGNOSIS — R05.9 COUGH, UNSPECIFIED: ICD-10-CM

## 2022-11-01 PROCEDURE — 99205 OFFICE O/P NEW HI 60 MIN: CPT

## 2022-11-01 NOTE — REVIEW OF SYSTEMS
[Depression] : depression [Fever] : no fever [Chills] : no chills [Feeling Poorly] : not feeling poorly [Feeling Tired] : not feeling tired [Eyesight Problems] : no eyesight problems [Discharge From Eyes] : no purulent discharge from the eyes [Nosebleeds] : no nosebleeds [Nasal Discharge] : no nasal discharge [Chest Pain] : no chest pain [Palpitations] : no palpitations [Cough] : no cough [SOB on Exertion] : no shortness of breath during exertion [Constipation] : no constipation [Diarrhea] : no diarrhea [Dysuria] : no dysuria [Incontinence] : no incontinence [Limb Pain] : no limb pain [Limb Swelling] : no limb swelling [Itching] : no itching [Change In A Mole] : no change in a mole [Dizziness] : no dizziness [Fainting] : no fainting [Anxiety] : no anxiety

## 2022-11-01 NOTE — ASSESSMENT
[FreeTextEntry1] : establishing care\par will increase fluoxetine to 40mg daily on trial basis\par next visit annal\par will need labs\par continue to watch BP (hypotension)\par dense breasts will need annual screenings

## 2022-11-01 NOTE — HISTORY OF PRESENT ILLNESS
[No falls in past year] : Patient reported no falls in the past year [Completely Independent] : Completely independent. [Smoke Detector] : smoke detector [Carbon Monoxide Detector] : carbon monoxide detector [Wears Seat Belt] : wears seat belt [1] : 2) Feeling down, depressed, or hopeless for several days (1) [PHQ-2 Negative - No further assessment needed] : PHQ-2 Negative - No further assessment needed [FreeTextEntry1] : 67 year old female PMH osteoporosis now on risendronate, elevated LFTs working with Dr. Myrick [facility],  presenting to establish care\par \par She tells me that daughter was diagnosed with Celiac Disease and so patient is now on gluten free diet with great improvement in constipation.  LFTs are also improving per self report.  She has started risendronate and feels okay - denies indigetsion.  \par \par She does have lower back pain at times\par \par Travels to [place] where her 2 daughters and 2 granddaughters live\par She spends several weeks several times a year but comes back as her  is home in [place] \par \par meds:\par fluoxetine 20mg\par ca/vit D\par omega 3 \par MVI\par cranberry mannose [Driving Concerns] : not driving or driving without noted concerns [[id number]] : 2

## 2022-11-01 NOTE — PHYSICAL EXAM
[Alert] : alert [Well Nourished] : well nourished [No Acute Distress] : in no acute distress [Well Developed] : well developed [Sclera] : the sclera and conjunctiva were normal [EOMI] : extraocular movements were intact [PERRL] : pupils were equal in size, round, and reactive to light [Normal Oral Mucosa] : normal oral mucosa [No Oral Pallor] : no oral pallor [No Respiratory Distress] : no respiratory distress [No Acc Muscle Use] : no accessory muscle use [Respiration, Rhythm And Depth] : normal respiratory rhythm and effort [Normal S1, S2] : normal S1 and S2 [Heart Sounds Gallop] : no gallops [No Rubs] : no pericardial rub [Bowel Sounds] : normal bowel sounds [Abdomen Tenderness] : non-tender [Abdomen Soft] : soft [No CVA Tenderness] : no CVA  tenderness [No Spinal Tenderness] : no spinal tenderness [Normal Color / Pigmentation] : normal skin color and pigmentation [] : no rash

## 2022-11-15 RX ORDER — FLUOXETINE HYDROCHLORIDE 20 MG/1
20 TABLET ORAL
Qty: 180 | Refills: 0 | Status: COMPLETED | COMMUNITY
Start: 2022-05-12 | End: 2022-11-15

## 2022-11-23 LAB — COLLAGEN CTX SERPL-MCNC: 343 PG/ML

## 2022-12-05 ENCOUNTER — RESULT REVIEW (OUTPATIENT)
Age: 67
End: 2022-12-05

## 2022-12-05 DIAGNOSIS — R30.0 DYSURIA: ICD-10-CM

## 2022-12-05 DIAGNOSIS — N39.0 URINARY TRACT INFECTION, SITE NOT SPECIFIED: ICD-10-CM

## 2023-01-19 ENCOUNTER — LABORATORY RESULT (OUTPATIENT)
Age: 68
End: 2023-01-19

## 2023-01-23 ENCOUNTER — APPOINTMENT (OUTPATIENT)
Dept: ENDOCRINOLOGY | Facility: CLINIC | Age: 68
End: 2023-01-23
Payer: MEDICARE

## 2023-01-23 VITALS
BODY MASS INDEX: 21.17 KG/M2 | HEART RATE: 80 BPM | DIASTOLIC BLOOD PRESSURE: 60 MMHG | OXYGEN SATURATION: 96 % | SYSTOLIC BLOOD PRESSURE: 100 MMHG | HEIGHT: 64 IN | WEIGHT: 124 LBS

## 2023-01-23 LAB
24R-OH-CALCIDIOL SERPL-MCNC: 39.2 PG/ML
25(OH)D3 SERPL-MCNC: 36.4 NG/ML
ALBUMIN SERPL ELPH-MCNC: 4.2 G/DL
ALP BLD-CCNC: 47 U/L
ALT SERPL-CCNC: 108 U/L
ANION GAP SERPL CALC-SCNC: 11 MMOL/L
AST SERPL-CCNC: 91 U/L
BILIRUB SERPL-MCNC: 0.5 MG/DL
BUN SERPL-MCNC: 10 MG/DL
CALCIUM SERPL-MCNC: 9.4 MG/DL
CALCIUM SERPL-MCNC: 9.4 MG/DL
CHLORIDE SERPL-SCNC: 101 MMOL/L
CO2 SERPL-SCNC: 28 MMOL/L
CREAT SERPL-MCNC: 0.64 MG/DL
EGFR: 98 ML/MIN/1.73M2
GLUCOSE SERPL-MCNC: 89 MG/DL
MAGNESIUM SERPL-MCNC: 2 MG/DL
PARATHYROID HORMONE INTACT: 49 PG/ML
PHOSPHATE SERPL-MCNC: 3.8 MG/DL
POTASSIUM SERPL-SCNC: 4.7 MMOL/L
PROT SERPL-MCNC: 7.6 G/DL
SODIUM SERPL-SCNC: 140 MMOL/L
TSH SERPL-ACNC: 1.97 UIU/ML

## 2023-01-23 PROCEDURE — 99215 OFFICE O/P EST HI 40 MIN: CPT

## 2023-01-23 RX ORDER — CRANBERRY FRUIT EXTRACT 200 MG
CAPSULE ORAL
Refills: 0 | Status: DISCONTINUED | COMMUNITY
End: 2023-01-23

## 2023-01-23 RX ORDER — SULFAMETHOXAZOLE AND TRIMETHOPRIM 800; 160 MG/1; MG/1
800-160 TABLET ORAL
Qty: 10 | Refills: 0 | Status: DISCONTINUED | COMMUNITY
Start: 2022-12-05 | End: 2023-01-23

## 2023-01-23 NOTE — PHYSICAL EXAM
[Alert] : alert [Well Nourished] : well nourished [No Acute Distress] : no acute distress [Well Developed] : well developed [Normal Sclera/Conjunctiva] : normal sclera/conjunctiva [EOMI] : extra ocular movement intact [No Proptosis] : no proptosis [Normal Oropharynx] : the oropharynx was normal [No Thyroid Nodules] : no palpable thyroid nodules [No Respiratory Distress] : no respiratory distress [No Accessory Muscle Use] : no accessory muscle use [No Edema] : no peripheral edema [Pedal Pulses Normal] : the pedal pulses are present [Normal Bowel Sounds] : normal bowel sounds [Not Tender] : non-tender [Not Distended] : not distended [Soft] : abdomen soft [Normal Anterior Cervical Nodes] : no anterior cervical lymphadenopathy [Normal Posterior Cervical Nodes] : no posterior cervical lymphadenopathy [No Spinal Tenderness] : no spinal tenderness [Spine Straight] : spine straight [No Stigmata of Cushings Syndrome] : no stigmata of Cushings Syndrome [Normal Gait] : normal gait [Normal Strength/Tone] : muscle strength and tone were normal [No Rash] : no rash [Normal Reflexes] : deep tendon reflexes were 2+ and symmetric [No Tremors] : no tremors [Oriented x3] : oriented to person, place, and time [Acanthosis Nigricans] : no acanthosis nigricans [de-identified] : Looks tired

## 2023-01-23 NOTE — HISTORY OF PRESENT ILLNESS
[Family History of Osteoporosis] : family history of osteoporosis [FreeTextEntry1] : Ms. FARHAN ROSALES is a 67 year old female feeling more tired recently not a higher LFTs primary care also liver specialist at [facility]\par Seeing me today for osteoporosis on Actonel started July 2022 first treatment MRI for her osteoporosis not compliant with her calcium and vitamin D\par TAHBO at 50 yo benign mass \par most recent March 2022 dental implant done in [place] \par last DEXA 3/11/2021 LS -2.3\par Femural neck -2.5\par \par no calcium and Vit D \par \par was told blood in the urine will see Urology test was 4/2021\par \par on ETNA from her country for a dental implant 8/15/21 in [place] (trifosfato Trssodica de uridina 1.5mg 3 times a day per her dentist  [Low Calcium Intake] : no low calcium intake [Low Vit D Intake/Exposure] : no low vitamin D intake [Premat. Menopause (natural)] : no history of premature menopause [Premat. Menopause (surgery)] : no history of premature surgical menopause [Amenorrhea] : no amenorrhea [Disordered Eating] : no history of disordered eating [Taking Steroids] : no history of taking steroids [Hyperparathyroidism] : no history of hyperparathyroidism [Diabetes] : no history of diabetes [Kidney Stones] : no history of kidney stones [Family History of Breast Cancer] : no family history of breast cancer [Family History of Hip Fracture] : no family history of hip fracture [History of Radiation Therapy] : no history of radiation therapy [History of Blood Clots] : no history of blood clots [High Fall Risk] : no fall risk [Frequent Falls] : no frequent falls [Uses a Walker/Cane] : no use of a walker/cane [Inability to Stand Straight] : no inability to stand straight [Loss of Height] : no loss of height [Loss of Balance] : no loss of balance [Change in Clothing Fit] : no change in clothing fit [Weight Gain] : no weight gain [Difficulty Ambulating] : no difficulty ambulating [Hip Pain] : no hip pain [Wrist Pain] : no wrist pain [Difficulty with Stairs] : no difficulty with stairs [Arthralgias] : no arthralgias [Myalgias] : no myalgias [New Fracture] : no new fracture

## 2023-01-23 NOTE — REVIEW OF SYSTEMS
[Recent Weight Loss (___ Lbs)] : recent weight loss: [unfilled] lbs [Palpitations] : palpitations [Cough] : cough [Polyuria] : polyuria [Myalgia] : myalgia  [Negative] : Heme/Lymph [Fatigue] : fatigue [FreeTextEntry2] : on gluten free diet helps a lot per pt [FreeTextEntry3] : hoarseness  [FreeTextEntry5] : seen Cardiology 10yrs ago last timeonce in a while per pt  [FreeTextEntry7] : blood in the urine seen Urology will have Cystoscopy  [de-identified] : very low BP per pt fainting in the past per pt

## 2023-02-02 ENCOUNTER — NON-APPOINTMENT (OUTPATIENT)
Age: 68
End: 2023-02-02

## 2023-02-02 ENCOUNTER — APPOINTMENT (OUTPATIENT)
Dept: GERIATRICS | Facility: CLINIC | Age: 68
End: 2023-02-02
Payer: MEDICARE

## 2023-02-02 VITALS
TEMPERATURE: 97.7 F | OXYGEN SATURATION: 93 % | BODY MASS INDEX: 20.58 KG/M2 | HEART RATE: 73 BPM | SYSTOLIC BLOOD PRESSURE: 108 MMHG | WEIGHT: 119.9 LBS | DIASTOLIC BLOOD PRESSURE: 68 MMHG

## 2023-02-02 DIAGNOSIS — B96.89 ACUTE UPPER RESPIRATORY INFECTION, UNSPECIFIED: ICD-10-CM

## 2023-02-02 DIAGNOSIS — J06.9 ACUTE UPPER RESPIRATORY INFECTION, UNSPECIFIED: ICD-10-CM

## 2023-02-02 DIAGNOSIS — E78.5 HYPERLIPIDEMIA, UNSPECIFIED: ICD-10-CM

## 2023-02-02 DIAGNOSIS — J30.9 ALLERGIC RHINITIS, UNSPECIFIED: ICD-10-CM

## 2023-02-02 PROCEDURE — 93000 ELECTROCARDIOGRAM COMPLETE: CPT | Mod: 59

## 2023-02-02 PROCEDURE — G0402 INITIAL PREVENTIVE EXAM: CPT

## 2023-02-02 NOTE — HISTORY OF PRESENT ILLNESS
[0] : 0 [No falls in past year] : Patient reported no falls in the past year [Completely Independent] : Completely independent. [PMH Reviewed and Updated] : past medical history reviewed and updated [PSH Reviewed and Updated] : past surgical history reviewed and updated [Family History Reviewed and Updated] : family history reviewed and updated [Medication and Allergies Reconciled] : medication and allergies reconciled [Never] : has never used illicit drugs [Compliant with medications] : compliant with medications [Adequate] : adequate [Spouse] : spouse [Children] : children [Fully Independent] : fully independent [No history of falls] : no history of falls [1] : 2) Feeling down, depressed, or hopeless for several days (1) [PHQ-2 Negative - No further assessment needed] : PHQ-2 Negative - No further assessment needed [Over the Past 2 Weeks, Have You Felt Down, Depressed, or Hopeless?] : 1.) Over the past 2 weeks, have you felt down, depressed, or hopeless? No [Over the Past 2 Weeks, Have You Felt Little Interest or Pleasure Doing Things?] : 2.) Over the past 2 weeks, have you felt little interest or pleasure doing things? No [Unable To Manage Meds] : ability to manage ~his/her~ medications [Seatbelts] : not using seatbelts [de-identified] : o [FreeTextEntry1] : 67 year old female PMH depression, osteoporosis, elevated LFTs, presenting for annual physical\par \par She tells me that daughter was diagnosed with Celiac Disease and so patient is now on gluten free diet with great improvement in constipation. LFTs on recent testing with endocrine rising again.  She is back on lower dose of fluoxetine as higher dose made her more tired.\par She tells me that she feels overwhelmingly fatigued.  She apparently did have some EKG changes in the past (approx 10 years ago) - sounds that she might have had treadmill stress test but was reassured.  \par \par She has had head congestion, PND and headaches x 2 weeks.  \par \par Travels to [place] where her 3 daughters and granddaughters live\par New baby arriving May 2023 and she will help with the baby for several months\par \par She spends several weeks several times a year but comes back as her  is home in [place] \par \par  [[id number]] : 2

## 2023-02-02 NOTE — REVIEW OF SYSTEMS
[Fever] : no fever [Chills] : no chills [Feeling Poorly] : not feeling poorly [Feeling Tired] : not feeling tired [Eyesight Problems] : no eyesight problems [Discharge From Eyes] : no purulent discharge from the eyes [Nosebleeds] : no nosebleeds [Nasal Discharge] : no nasal discharge [Chest Pain] : no chest pain [Palpitations] : no palpitations [Cough] : no cough [SOB on Exertion] : no shortness of breath during exertion [Constipation] : no constipation [Diarrhea] : no diarrhea [Dysuria] : no dysuria [Incontinence] : no incontinence [Limb Pain] : no limb pain [Limb Swelling] : no limb swelling [Itching] : no itching [Change In A Mole] : no change in a mole [Dizziness] : no dizziness [Fainting] : no fainting [Anxiety] : no anxiety [Depression] : depression

## 2023-02-02 NOTE — PHYSICAL EXAM
[Alert] : alert [Well Nourished] : well nourished [Well Developed] : well developed [Sclera] : the sclera and conjunctiva were normal [EOMI] : extraocular movements were intact [PERRL] : pupils were equal in size, round, and reactive to light [Normal Oral Mucosa] : normal oral mucosa [No Oral Pallor] : no oral pallor [No Respiratory Distress] : no respiratory distress [No Acc Muscle Use] : no accessory muscle use [Respiration, Rhythm And Depth] : normal respiratory rhythm and effort [Normal S1, S2] : normal S1 and S2 [Heart Sounds Gallop] : no gallops [No Rubs] : no pericardial rub [Breast Palpation Mass] : no palpable masses [No Nipple Discharge] : no nipple discharge [Bowel Sounds] : normal bowel sounds [Abdomen Tenderness] : non-tender [Abdomen Soft] : soft [No CVA Tenderness] : no CVA  tenderness [No Spinal Tenderness] : no spinal tenderness [Normal Color / Pigmentation] : normal skin color and pigmentation [] : no rash [de-identified] : BL nipple scars [de-identified] : vertical abdominal scar noted

## 2023-02-02 NOTE — ASSESSMENT
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 Carlisle in spring for GERD\par now on treatment for osteoporosis so trying to avoid PPI\par she is due for pneumonia shot\par BUT given sinusitis will treat now\par she is also to have a new granddaughter and actively caring for her in May\par so I want her to complete antibiotics and then come back for Tdap or have it at local phcy\par we can start pneumonia shots when she is back from [place] or at next visit\par

## 2023-02-15 ENCOUNTER — NON-APPOINTMENT (OUTPATIENT)
Age: 68
End: 2023-02-15

## 2023-02-15 ENCOUNTER — APPOINTMENT (OUTPATIENT)
Dept: CARDIOLOGY | Facility: CLINIC | Age: 68
End: 2023-02-15
Payer: MEDICARE

## 2023-02-15 ENCOUNTER — MED ADMIN CHARGE (OUTPATIENT)
Age: 68
End: 2023-02-15

## 2023-02-15 ENCOUNTER — APPOINTMENT (OUTPATIENT)
Dept: GERIATRICS | Facility: CLINIC | Age: 68
End: 2023-02-15
Payer: MEDICARE

## 2023-02-15 VITALS
BODY MASS INDEX: 20.66 KG/M2 | RESPIRATION RATE: 14 BRPM | HEIGHT: 64 IN | OXYGEN SATURATION: 100 % | SYSTOLIC BLOOD PRESSURE: 96 MMHG | WEIGHT: 121 LBS | DIASTOLIC BLOOD PRESSURE: 61 MMHG | HEART RATE: 65 BPM

## 2023-02-15 DIAGNOSIS — Z87.891 PERSONAL HISTORY OF NICOTINE DEPENDENCE: ICD-10-CM

## 2023-02-15 DIAGNOSIS — Z82.49 FAMILY HISTORY OF ISCHEMIC HEART DISEASE AND OTHER DISEASES OF THE CIRCULATORY SYSTEM: ICD-10-CM

## 2023-02-15 DIAGNOSIS — R53.83 OTHER FATIGUE: ICD-10-CM

## 2023-02-15 PROCEDURE — 99204 OFFICE O/P NEW MOD 45 MIN: CPT

## 2023-02-15 PROCEDURE — G0009: CPT

## 2023-02-15 PROCEDURE — 90677 PCV20 VACCINE IM: CPT | Mod: GY

## 2023-02-15 NOTE — CARDIOLOGY SUMMARY
[de-identified] : \par 2/2/23 ECG: Sinus rhythm, rate 69 bpm, low voltage QRS, non-specific T wave abnormalities\par  [de-identified] : \par 11/25/11 Echo (at [facility]): Normal LV size and systolic/diastolic function, LVEF > 55%. Midl MR.

## 2023-02-15 NOTE — ASSESSMENT
[FreeTextEntry1] : 68 yo female with abnormal ECG on 2/2/23 and exertional fatigue/SOB over the past 2-3 months. \par ECG on 2/2/23 demonstrated sinus rhythm, rate 69 bpm, low voltage QRS, non-specific T wave abnormalities.\par \par Given exertional fatigue/SOB, will perform treadmill stress ECG for ischemic evaluation/risk stratification.\par Given abnormal ECG, will perform echocardiogram to assess LV function and structural heart disease.\par Will check CBC.\par After review of test results, will determine if further cardiac work-up or intervention is clinically indicated.\par \par

## 2023-02-15 NOTE — REASON FOR VISIT
Have Your Skin Lesions Been Treated?: not been treated
Is This A New Presentation, Or A Follow-Up?: Skin Lesion
[Other: ____] : [unfilled]

## 2023-02-15 NOTE — HISTORY OF PRESENT ILLNESS
[FreeTextEntry1] : 66 yo female who presents today for cardiac evaluation of abnormal ECG on 2/2/23 and exertional fatigue/SOB over the past 2-3 months. Patient denies chest pain, palpitations, syncope, edema, melena, hematochezia, or hematemesis.\par

## 2023-02-16 LAB
BASOPHILS # BLD AUTO: 0.07 K/UL
BASOPHILS NFR BLD AUTO: 1 %
EOSINOPHIL # BLD AUTO: 0.49 K/UL
EOSINOPHIL NFR BLD AUTO: 7.3 %
HCT VFR BLD CALC: 41.4 %
HGB BLD-MCNC: 12.8 G/DL
IMM GRANULOCYTES NFR BLD AUTO: 0.1 %
LYMPHOCYTES # BLD AUTO: 1.92 K/UL
LYMPHOCYTES NFR BLD AUTO: 28.7 %
MAN DIFF?: NORMAL
MCHC RBC-ENTMCNC: 30.9 GM/DL
MCHC RBC-ENTMCNC: 31.1 PG
MCV RBC AUTO: 100.7 FL
MONOCYTES # BLD AUTO: 0.71 K/UL
MONOCYTES NFR BLD AUTO: 10.6 %
NEUTROPHILS # BLD AUTO: 3.5 K/UL
NEUTROPHILS NFR BLD AUTO: 52.3 %
PLATELET # BLD AUTO: 214 K/UL
RBC # BLD: 4.11 M/UL
RBC # FLD: 13 %
WBC # FLD AUTO: 6.7 K/UL

## 2023-02-21 ENCOUNTER — APPOINTMENT (OUTPATIENT)
Dept: CARDIOLOGY | Facility: CLINIC | Age: 68
End: 2023-02-21
Payer: MEDICARE

## 2023-02-21 DIAGNOSIS — R94.31 ABNORMAL ELECTROCARDIOGRAM [ECG] [EKG]: ICD-10-CM

## 2023-02-21 PROCEDURE — 93306 TTE W/DOPPLER COMPLETE: CPT

## 2023-02-22 PROBLEM — R94.31 ABNORMAL ECG: Status: ACTIVE | Noted: 2023-02-15

## 2023-02-24 ENCOUNTER — NON-APPOINTMENT (OUTPATIENT)
Age: 68
End: 2023-02-24

## 2023-02-27 ENCOUNTER — APPOINTMENT (OUTPATIENT)
Dept: CARDIOLOGY | Facility: CLINIC | Age: 68
End: 2023-02-27
Payer: MEDICARE

## 2023-02-27 DIAGNOSIS — R06.02 SHORTNESS OF BREATH: ICD-10-CM

## 2023-02-27 PROCEDURE — 93015 CV STRESS TEST SUPVJ I&R: CPT

## 2023-04-06 ENCOUNTER — APPOINTMENT (OUTPATIENT)
Dept: HEPATOLOGY | Facility: CLINIC | Age: 68
End: 2023-04-06

## 2023-04-06 ENCOUNTER — RESULT REVIEW (OUTPATIENT)
Age: 68
End: 2023-04-06

## 2023-04-12 ENCOUNTER — APPOINTMENT (OUTPATIENT)
Dept: GASTROENTEROLOGY | Facility: CLINIC | Age: 68
End: 2023-04-12
Payer: MEDICARE

## 2023-04-12 ENCOUNTER — APPOINTMENT (OUTPATIENT)
Dept: GERIATRICS | Facility: CLINIC | Age: 68
End: 2023-04-12
Payer: MEDICARE

## 2023-04-12 VITALS
WEIGHT: 124 LBS | OXYGEN SATURATION: 96 % | HEIGHT: 64 IN | SYSTOLIC BLOOD PRESSURE: 90 MMHG | HEART RATE: 75 BPM | DIASTOLIC BLOOD PRESSURE: 60 MMHG | BODY MASS INDEX: 21.17 KG/M2

## 2023-04-12 VITALS
DIASTOLIC BLOOD PRESSURE: 66 MMHG | BODY MASS INDEX: 20.5 KG/M2 | SYSTOLIC BLOOD PRESSURE: 96 MMHG | HEART RATE: 67 BPM | TEMPERATURE: 97.2 F | OXYGEN SATURATION: 97 % | WEIGHT: 119.4 LBS

## 2023-04-12 DIAGNOSIS — Z23 ENCOUNTER FOR IMMUNIZATION: ICD-10-CM

## 2023-04-12 PROCEDURE — 99204 OFFICE O/P NEW MOD 45 MIN: CPT

## 2023-04-12 PROCEDURE — 90471 IMMUNIZATION ADMIN: CPT

## 2023-04-12 PROCEDURE — 90715 TDAP VACCINE 7 YRS/> IM: CPT | Mod: GY

## 2023-04-12 PROCEDURE — 99214 OFFICE O/P EST MOD 30 MIN: CPT | Mod: 25

## 2023-04-13 NOTE — REVIEW OF SYSTEMS
[Fever] : no fever [Chills] : no chills [Feeling Poorly] : not feeling poorly [Feeling Tired] : not feeling tired [Eyesight Problems] : no eyesight problems [Discharge From Eyes] : no purulent discharge from the eyes [Nosebleeds] : no nosebleeds [Chest Pain] : no chest pain [Nasal Discharge] : no nasal discharge [Palpitations] : no palpitations [Cough] : no cough [SOB on Exertion] : no shortness of breath during exertion [Constipation] : no constipation [Diarrhea] : no diarrhea [Dysuria] : no dysuria [Incontinence] : no incontinence [Limb Pain] : no limb pain [Limb Swelling] : no limb swelling [Itching] : no itching [Change In A Mole] : no change in a mole [Dizziness] : no dizziness [Fainting] : no fainting [Anxiety] : no anxiety [Depression] : depression

## 2023-04-13 NOTE — PHYSICAL EXAM
[Well Nourished] : well nourished [Alert] : alert [Well Developed] : well developed [Sclera] : the sclera and conjunctiva were normal [EOMI] : extraocular movements were intact [PERRL] : pupils were equal in size, round, and reactive to light [Normal Oral Mucosa] : normal oral mucosa [No Oral Pallor] : no oral pallor [No Respiratory Distress] : no respiratory distress [No Acc Muscle Use] : no accessory muscle use [Respiration, Rhythm And Depth] : normal respiratory rhythm and effort [Normal S1, S2] : normal S1 and S2 [Heart Sounds Gallop] : no gallops [No Rubs] : no pericardial rub [Bowel Sounds] : normal bowel sounds [Abdomen Tenderness] : non-tender [Abdomen Soft] : soft [No CVA Tenderness] : no CVA  tenderness [Normal Color / Pigmentation] : normal skin color and pigmentation [No Spinal Tenderness] : no spinal tenderness [] : no rash [de-identified] : vertical abdominal scar noted

## 2023-04-13 NOTE — ASSESSMENT
[FreeTextEntry1] : reviewed labs\par LFTs still elevated but down trending\par Lipids are excellent\par awaiting consult with hepatology\par new grandbaby in the family\par gave Tdap today\par RTC for MOL[facility]

## 2023-04-13 NOTE — HISTORY OF PRESENT ILLNESS
[No falls in past year] : Patient reported no falls in the past year [Completely Independent] : Completely independent. [0] : 2) Feeling down, depressed, or hopeless: Not at all (0) [FreeTextEntry1] : 67 year old female PMH depression, osteoporosis, elevated LFTs, presenting for annual physical\par \par She tells me that daughter was diagnosed with Celiac Disease and so patient is now on gluten free diet with great improvement in constipation. LFTs on recent testing with endocrine rising again.  She is back on lower dose of fluoxetine as higher dose made her more tired.\par She tells me that she feels overwhelmingly fatigued.  She apparently did have some EKG changes in the past (approx 10 years ago) - sounds that she might have had treadmill stress test but was reassured.  \par \par She has had head congestion, PND and headaches x 2 weeks.  \par \par Travels to [place] where her 3 daughters and granddaughters live\par New baby arriving May 2023 and she will help with the baby for several months\par \par She spends several weeks several times a year but comes back as her  is home in [place] \par update 4/12/23\par \par will travel to [place] to be with daughter and baby which is due next month\par open to Tdap\par now waiting to see hepatologist\par did have labs recently\par \par \par  [Smoke Detector] : smoke detector [PHQ-2 Negative - No further assessment needed] : PHQ-2 Negative - No further assessment needed [[id number]] : 0

## 2023-04-16 NOTE — HISTORY OF PRESENT ILLNESS
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 She is seen at the request of Dr. Meli Posadas. \par \par 6 months ago started to cough again - stared on omeprazole 20 mg every morning, feels slowly better, still has an itching her her chest/throat that leads to coughing. \par if she eats to close to bed time, then she wakes up with coughing ,feels acid in her mouth \par started allergy medication 1 month ago which she also feels has been helpful. \par hepatology appointment in July- \par she has seen 2 prior hepatologists, she has had liver bx in [place]. \par stopped gluten in Jan 2022, daughter has celiac. \par her cholesterol has since decreased. \par \par EGD 03/2021- moderate chronic gastritis, focal IM recommend repeat EGD in 3 y\par scheduled to see hepatologist\par cough improving with allergy medication. \par labs 01/2023- AST 91, \par \par prior hx\par She has had it her whole life. \par cough is worse  x4-5 years. worse at night and when she wakes up in the am. \par she has taken PPI x 6 months, which is helpful, 70 % helpful. \par she takes it daily. \par cough is mildly productive\par \par record review\par Colonoscopy 8/31/20- Dr. Aubrie Bolden- \par 7 mm cecal polyp, path hyperplastic\par diverticula- \par internal hemorrhoids\par recall in 3 years. \par \par EGD 4/23/2013 Dr. Pantoja- 1-2 cm HH, mild gastric erythema, neg HP\par \par Colonoscopy 11/7/2011-+ FIT Dr. Pantoja hemorrhoids\par \par 07/21/2020- blastocystis\par \par US 6/5/18-hepatic cysts, left renal cyst and angiomyolipoma, adenomyomatosis\par \par Abd US 11/22/2011- right liver cyst\par \par Pelvic US 11/22/201--s/p DONNA BSO,no mass or cyst\par \[place]oplasm[place] infiltr[place] with periportal lobular activity associated to moderate eosinophilia, possible dx can be medication induced hepatotoxicity or another etiology associated to autoimmune hepatitis or vice-verse., close correlation of is recommended also ruling out parasitic disease. \par \par labs notable for 08/2020 / Alt 131\par 06/2020 AST 60 ALT 69\par PMD: Dr Spann

## 2023-04-16 NOTE — ASSESSMENT
[FreeTextEntry1] : GERD/LPR- increase omeprazole to 40 mg daily\par add pepcid at night\par -EGD with biopsy\par \par h/o abnormal LFTS- patient reports prior evaluation by other gi physicians and liver bx in [place]. \par referred to hepatologist. \par \par Colon cancer screening-pre records,  patient due for colonoscopy\par 2 Dulcolax two days prior to procedure + Split MiraLAX/Dulcolax preparation starting day prior to procedure\par Risks (including but not limited to sedation risks, infection, bleeding, perforation, possibility of missed lesions), benefits and alternatives to procedure, including not doing the procedure, were discussed with patient. Patient understood and agreed to proceed with colonoscopy. \par Colonoscopy preparation instructions reviewed with patient.\par \par .

## 2023-04-16 NOTE — PHYSICAL EXAM
Please see telephone encounter from 10/26/17.  Dr. Granado is reviewing the images through the PACS system.  The message from 10/26/17 was sent back to him.     [General Appearance - Alert] : alert [General Appearance - In No Acute Distress] : in no acute distress [Sclera] : the sclera and conjunctiva were normal [Outer Ear] : the ears and nose were normal in appearance [Neck Appearance] : the appearance of the neck was normal [] : no respiratory distress [Apical Impulse] : the apical impulse was normal [Abdomen Tenderness] : non-tender [Abdomen Soft] : soft [Abnormal Walk] : normal gait [Skin Color & Pigmentation] : normal skin color and pigmentation [Cranial Nerves] : cranial nerves 2-12 were intact [Oriented To Time, Place, And Person] : oriented to person, place, and time [FreeTextEntry1] : deferred

## 2023-04-17 ENCOUNTER — RESULT REVIEW (OUTPATIENT)
Age: 68
End: 2023-04-17

## 2023-04-27 ENCOUNTER — TRANSCRIPTION ENCOUNTER (OUTPATIENT)
Age: 68
End: 2023-04-27

## 2023-07-06 ENCOUNTER — APPOINTMENT (OUTPATIENT)
Dept: HEPATOLOGY | Facility: CLINIC | Age: 68
End: 2023-07-06

## 2023-08-03 ENCOUNTER — RX RENEWAL (OUTPATIENT)
Age: 68
End: 2023-08-03

## 2023-08-24 ENCOUNTER — APPOINTMENT (OUTPATIENT)
Dept: GASTROENTEROLOGY | Facility: HOSPITAL | Age: 68
End: 2023-08-24

## 2023-09-19 LAB
ALBUMIN MFR SERPL ELPH: 53.7 %
ALBUMIN SERPL-MCNC: 4.1 G/DL
ALBUMIN/GLOB SERPL: 1.2 RATIO
ALPHA1 GLOB MFR SERPL ELPH: 3.7 %
ALPHA1 GLOB SERPL ELPH-MCNC: 0.3 G/DL
ALPHA2 GLOB MFR SERPL ELPH: 10.1 %
ALPHA2 GLOB SERPL ELPH-MCNC: 0.8 G/DL
B-GLOBULIN MFR SERPL ELPH: 10.6 %
B-GLOBULIN SERPL ELPH-MCNC: 0.8 G/DL
COLLAGEN CTX SERPL-MCNC: 99 PG/ML
GAMMA GLOB FLD ELPH-MCNC: 1.7 G/DL
GAMMA GLOB MFR SERPL ELPH: 21.9 %
INTERPRETATION SERPL IEP-IMP: NORMAL
PROT SERPL-MCNC: 7.6 G/DL
PROT SERPL-MCNC: 7.6 G/DL

## 2023-09-19 RX ORDER — RISEDRONATE SODIUM 150 MG/1
150 TABLET, FILM COATED ORAL
Qty: 1 | Refills: 3 | Status: DISCONTINUED | COMMUNITY
Start: 2022-07-25 | End: 2023-09-19

## 2023-10-19 ENCOUNTER — APPOINTMENT (OUTPATIENT)
Dept: GERIATRICS | Facility: CLINIC | Age: 68
End: 2023-10-19
Payer: MEDICARE

## 2023-10-19 VITALS
DIASTOLIC BLOOD PRESSURE: 62 MMHG | BODY MASS INDEX: 21.2 KG/M2 | WEIGHT: 123.5 LBS | SYSTOLIC BLOOD PRESSURE: 98 MMHG | TEMPERATURE: 97.4 F | HEART RATE: 82 BPM | OXYGEN SATURATION: 97 %

## 2023-10-19 DIAGNOSIS — K76.89 OTHER SPECIFIED DISEASES OF LIVER: ICD-10-CM

## 2023-10-19 PROCEDURE — 99215 OFFICE O/P EST HI 40 MIN: CPT | Mod: 25

## 2023-10-19 PROCEDURE — 99497 ADVNCD CARE PLAN 30 MIN: CPT | Mod: 25

## 2023-10-19 RX ORDER — FLUOXETINE HYDROCHLORIDE 20 MG/1
20 CAPSULE ORAL
Qty: 90 | Refills: 3 | Status: ACTIVE | COMMUNITY
Start: 2022-11-15 | End: 1900-01-01

## 2023-10-19 RX ORDER — AZITHROMYCIN 250 MG/1
250 TABLET, FILM COATED ORAL
Qty: 1 | Refills: 0 | Status: COMPLETED | COMMUNITY
Start: 2023-02-02 | End: 2023-10-19

## 2023-10-24 ENCOUNTER — APPOINTMENT (OUTPATIENT)
Dept: GERIATRICS | Facility: CLINIC | Age: 68
End: 2023-10-24

## 2023-10-30 ENCOUNTER — APPOINTMENT (OUTPATIENT)
Dept: GASTROENTEROLOGY | Facility: HOSPITAL | Age: 68
End: 2023-10-30

## 2024-02-01 ENCOUNTER — RESULT REVIEW (OUTPATIENT)
Age: 69
End: 2024-02-01

## 2024-03-04 ENCOUNTER — APPOINTMENT (OUTPATIENT)
Dept: GERIATRICS | Facility: CLINIC | Age: 69
End: 2024-03-04
Payer: MEDICARE

## 2024-03-04 VITALS
HEART RATE: 74 BPM | BODY MASS INDEX: 22.2 KG/M2 | TEMPERATURE: 97.8 F | HEIGHT: 64 IN | WEIGHT: 130 LBS | DIASTOLIC BLOOD PRESSURE: 50 MMHG | SYSTOLIC BLOOD PRESSURE: 82 MMHG | OXYGEN SATURATION: 97 %

## 2024-03-04 VITALS — SYSTOLIC BLOOD PRESSURE: 84 MMHG | DIASTOLIC BLOOD PRESSURE: 50 MMHG

## 2024-03-04 DIAGNOSIS — R74.8 ABNORMAL LEVELS OF OTHER SERUM ENZYMES: ICD-10-CM

## 2024-03-04 DIAGNOSIS — N39.3 STRESS INCONTINENCE (FEMALE) (MALE): ICD-10-CM

## 2024-03-04 DIAGNOSIS — F32.A ANXIETY DISORDER, UNSPECIFIED: ICD-10-CM

## 2024-03-04 DIAGNOSIS — N60.19 DIFFUSE CYSTIC MASTOPATHY OF UNSPECIFIED BREAST: ICD-10-CM

## 2024-03-04 DIAGNOSIS — F41.9 ANXIETY DISORDER, UNSPECIFIED: ICD-10-CM

## 2024-03-04 PROCEDURE — G0444 DEPRESSION SCREEN ANNUAL: CPT | Mod: 59

## 2024-03-04 PROCEDURE — G0439: CPT

## 2024-03-04 RX ORDER — RISEDRONATE SODIUM 150 MG/1
150 TABLET, FILM COATED ORAL
Qty: 3 | Refills: 2 | Status: COMPLETED | COMMUNITY
Start: 2023-08-03 | End: 2024-03-04

## 2024-03-04 RX ORDER — FAMOTIDINE 40 MG/1
40 TABLET, FILM COATED ORAL DAILY
Qty: 30 | Refills: 9 | Status: COMPLETED | COMMUNITY
Start: 2023-04-12 | End: 2024-03-04

## 2024-03-04 RX ORDER — OMEPRAZOLE 40 MG/1
40 CAPSULE, DELAYED RELEASE ORAL
Qty: 30 | Refills: 6 | Status: COMPLETED | COMMUNITY
Start: 2023-04-12 | End: 2024-03-04

## 2024-03-04 NOTE — ASSESSMENT
[FreeTextEntry1] : signed medical release to get hepatologist consult note to confirm diagnosis she is clear she does not want to be on azathioprine, steroids or antibiotics discussed that this could eventually lead to cirrhosis but she does not want to pursue treatment at this time she will pursue RSV shot at local phcy due for mammogram and referred to GYN to have final papsmear as was not seen in several years (she is now 68 so technically beyond age for screening) dexa due in Spring she is on higher doses of Vitamin D but levels remain on normal range, continue to watch  minicog 5/5 today will bring in [facility] to renew next visit in 6 months in person visit to have gown/breast exam an EKG can call to have labs prior - would repeat LFTs and monitor closely

## 2024-03-04 NOTE — HISTORY OF PRESENT ILLNESS
[No falls in past year] : Patient reported no falls in the past year [Completely Independent] : Completely independent. [Smoke Detector] : smoke detector [Carbon Monoxide Detector] : carbon monoxide detector [PMH Reviewed and Updated] : past medical history reviewed and updated [Family History Reviewed and Updated] : family history reviewed and updated ambulatory [PSH Reviewed and Updated] : past surgical history reviewed and updated [Medication and Allergies Reconciled] : medication and allergies reconciled [Over the Past 2 Weeks, Have You Felt Down, Depressed, or Hopeless?] : 1.) Over the past 2 weeks, have you felt down, depressed, or hopeless? No [0] : 0 [Over the Past 2 Weeks, Have You Felt Little Interest or Pleasure Doing Things?] : 2.) Over the past 2 weeks, have you felt little interest or pleasure doing things? No [Retired] : retired from work [None] : The patient has no concerns about alcohol abuse [Never] : has never used illicit drugs [Compliant with medications] : compliant with medications [Unable To Manage Meds] : ability to manage ~his/her~ medications [Adequate] : adequate [Spouse] : spouse [Fully Independent] : fully independent [Children] : children [Drives without concerns] : drives without concerns [No history of falls] : no history of falls [Smoke Detectors] : smoke detectors [Seatbelts] : seatbelts [FreeTextEntry1] : 68 year old female PMH depression, osteoporosis, elevated LFTs, presenting for medicare wellness.    She tells me that daughter was diagnosed with Celiac Disease and so patient is now on gluten free diet with great improvement in constipation. LFTs on recent testing with endocrine rising again.  She did see a hepatologist who wanted her to start azathiporine, Bactrim 3x a week and Prednisone  but she has opted to NOT CONTINUE treatment.  She does not want to be on lifelong drugs.  She agreed to sign consent so I could at least see he consult note and confirm diagnosis and recommendations.    Also stopped risendronate.  She is following with endocrine.  Tells me plan is to switch to once a week drug formulation.  Travels to [place] where her 3 daughters and granddaughters live She spends several weeks several times a year but comes back as her  is home in [place]  Had liver biopsy with specialists in [place] Did have biopsy consistent with autoimmune disease  Has not had mammogram in years Due for bone density this spring Cannot recall last papsmear and has not seen GYN unsure if she had RSV shot       [1] : 2) Feeling down, depressed, or hopeless for several days (1) [PHQ-2 Positive] : PHQ-2 Positive [Several Days (1)] : 4.) Feeling tired or having little energy? Several days [Not at All (0)] : 9.) Thoughts that you would be off dead or of hurting yourself in some way? Not at all [Not at all] : How difficult have these problems made it for you to do your work, take care of things at home, or get along with people? Not at all [[id number]] : 2 [[id number]] : 4

## 2024-03-04 NOTE — PHYSICAL EXAM
[Alert] : alert [Healthy Appearing] : healthy appearing [No Acute Distress] : in no acute distress [Normal Voice/Communication] : normal voice/communication

## 2024-04-09 ENCOUNTER — APPOINTMENT (OUTPATIENT)
Dept: SURGERY | Facility: CLINIC | Age: 69
End: 2024-04-09

## 2024-05-17 ENCOUNTER — APPOINTMENT (OUTPATIENT)
Dept: OBGYN | Facility: CLINIC | Age: 69
End: 2024-05-17
Payer: MEDICARE

## 2024-05-17 DIAGNOSIS — Z12.31 ENCOUNTER FOR SCREENING MAMMOGRAM FOR MALIGNANT NEOPLASM OF BREAST: ICD-10-CM

## 2024-05-17 DIAGNOSIS — Z01.419 ENCOUNTER FOR GYNECOLOGICAL EXAMINATION (GENERAL) (ROUTINE) W/OUT ABNORMAL FINDINGS: ICD-10-CM

## 2024-05-17 PROCEDURE — G0101: CPT

## 2024-05-17 NOTE — PHYSICAL EXAM
[Chaperone Present] : A chaperone was present in the examining room during all aspects of the physical examination [00703] : A chaperone was present during the pelvic exam. [FreeTextEntry2] : JACKIE [place] [Appropriately responsive] : appropriately responsive [Alert] : alert [No Acute Distress] : no acute distress [No Lymphadenopathy] : no lymphadenopathy [Regular Rate Rhythm] : regular rate rhythm [No Murmurs] : no murmurs [Clear to Auscultation B/L] : clear to auscultation bilaterally [Soft] : soft [Non-tender] : non-tender [Non-distended] : non-distended [No HSM] : No HSM [No Lesions] : no lesions [No Mass] : no mass [Oriented x3] : oriented x3 [Examination Of The Breasts] : a normal appearance [No Masses] : no breast masses were palpable [Vulvar Atrophy] : vulvar atrophy [Labia Majora] : normal [Labia Minora] : normal [Normal] : normal [Atrophy] : atrophy [Absent] : absent [Uterine Adnexae] : absent

## 2024-05-20 ENCOUNTER — RESULT REVIEW (OUTPATIENT)
Age: 69
End: 2024-05-20

## 2024-05-22 ENCOUNTER — NON-APPOINTMENT (OUTPATIENT)
Age: 69
End: 2024-05-22

## 2024-05-22 ENCOUNTER — APPOINTMENT (OUTPATIENT)
Dept: GASTROENTEROLOGY | Facility: CLINIC | Age: 69
End: 2024-05-22
Payer: MEDICARE

## 2024-05-22 VITALS
WEIGHT: 130 LBS | DIASTOLIC BLOOD PRESSURE: 70 MMHG | SYSTOLIC BLOOD PRESSURE: 110 MMHG | HEIGHT: 64 IN | BODY MASS INDEX: 22.2 KG/M2

## 2024-05-22 DIAGNOSIS — K21.9 GASTRO-ESOPHAGEAL REFLUX DISEASE W/OUT ESOPHAGITIS: ICD-10-CM

## 2024-05-22 DIAGNOSIS — Z86.010 PERSONAL HISTORY OF COLONIC POLYPS: ICD-10-CM

## 2024-05-22 DIAGNOSIS — K76.9 LIVER DISEASE, UNSPECIFIED: ICD-10-CM

## 2024-05-22 DIAGNOSIS — K31.A0 GASTRIC INTESTINAL METAPLASIA, UNSPECIFIED: ICD-10-CM

## 2024-05-22 DIAGNOSIS — Z12.11 ENCOUNTER FOR SCREENING FOR MALIGNANT NEOPLASM OF COLON: ICD-10-CM

## 2024-05-22 PROCEDURE — 99214 OFFICE O/P EST MOD 30 MIN: CPT

## 2024-05-22 PROCEDURE — G2211 COMPLEX E/M VISIT ADD ON: CPT

## 2024-05-22 NOTE — REASON FOR VISIT
[Consultation] : a consultation visit [FreeTextEntry1] : Kindly asked by Dr. Posadas  to consult and evaluate patient for       colon screening and LFTs and int. metaplasia in stomach              A copy of this note is being sent to physician requesting consultation.

## 2024-05-22 NOTE — ASSESSMENT
[FreeTextEntry1] : - -GERD - Reviewed dietary and lifestyle modifications, including weight loss, smaller/frequent/earlier (>3h prior to lying down) meals, trials of cutting down/out caffeine, chocolate, tomatoes, fatty foods, alcohol.  Cont current regimen.   -gastritis w/ focal IM - will f/u with EGD as prior rec  - liver dz - autoimmune hepatitis by hx - refuses tx as above - advised pt to f/u with Dr. Mc - liver enzymes currently normal, fortunately.  - Colon cancer screening - colonoscopy due - Colonoscopy scheduled - Risks, benefits, alternatives were discussed, including but not limited to bleeding, infection, perforation and sedation risks. Additionally, the possibility of missed lesions was conveyed.

## 2024-05-22 NOTE — CONSULT LETTER
[FreeTextEntry1] : Dear Dr. MORALES LÓPEZ ,  I had the pleasure of evaluating your patient,  FARHAN ROSALES.  Please refer to my note below.  Thank you very much for allowing me to participate in the care of this patient.  If you have any questions, please do not hesitate to contact me.  Sincerely,   Richard Mauricio MD

## 2024-05-22 NOTE — HISTORY OF PRESENT ILLNESS
[FreeTextEntry1] : 68f hx DONNA/BSO (benign ovarian tumor), ventral herniorrhaphy, hx polyps, here to establish care.  She feels well.   Has hx GERD -but controls w/ diet and moderated caffeine intake.  She denies dysphagia. Has Hx EGD w/ focal int. metaplasia and was recommended 3y f/u - now due.  She had colonoscopy '20 with polyp removed and 3y f/u was recommended - now due.  She is gluten-free on her own b/c dtr has dx celiac but pt herself never tested, dxd or symptomatic.  She has a hx of "immune" liver disease - bx x2 and has seen hepatologists (2) and was last seen 1y ago by Dr. Mc at [facility] and was recommended to start tx - but pt has refused.  She unerstands risk of cirrhosis, decompensation and HCC.  2/2024 normal cbc, normal lfts  Soc:  no tobacco or significant EtOH FHx: no FHx GI malignancy or IBD  ROS: Constitutional:: no weight loss, fevers ENT: no deafness Eyes: not blind Neck: no LN Chest: no dyspnea/cough Cardiac: no chest pain Vascular: no leg swelling GI: no abdominal pain, nausea, vomiting, diarrhea, constipation, rectal bleeding, dysphagia, melena unless otherwise noted in HPI : no dysuria, dark urine Skin: no rashes, jaundice Heme: no bleeding Endocrine: no DM unless otherwise stated in HPI  Px: (VS noted below) General: NAD Eyes: anicteric Oropharynx:  clear Neck: no LN Chest: normal respiratory effort CVS: regular Abd: soft, NT, ND, +BS, no HSM Ext: no atrophy Neuro: grossly nonfocal  Labs/imaging/prior endoscopic results reviewed to the extent available and noted in HPI

## 2024-05-23 LAB
CYTOLOGY CVX/VAG DOC THIN PREP: ABNORMAL
HPV HIGH+LOW RISK DNA PNL CVX: NOT DETECTED

## 2024-06-03 ENCOUNTER — RESULT REVIEW (OUTPATIENT)
Age: 69
End: 2024-06-03

## 2024-06-04 ENCOUNTER — APPOINTMENT (OUTPATIENT)
Dept: GASTROENTEROLOGY | Facility: HOSPITAL | Age: 69
End: 2024-06-04

## 2024-06-13 ENCOUNTER — APPOINTMENT (OUTPATIENT)
Dept: ENDOCRINOLOGY | Facility: CLINIC | Age: 69
End: 2024-06-13
Payer: MEDICARE

## 2024-06-13 VITALS
HEART RATE: 77 BPM | BODY MASS INDEX: 21.34 KG/M2 | SYSTOLIC BLOOD PRESSURE: 108 MMHG | DIASTOLIC BLOOD PRESSURE: 64 MMHG | OXYGEN SATURATION: 98 % | HEIGHT: 64 IN | WEIGHT: 125 LBS

## 2024-06-13 DIAGNOSIS — E55.9 VITAMIN D DEFICIENCY, UNSPECIFIED: ICD-10-CM

## 2024-06-13 DIAGNOSIS — M81.0 AGE-RELATED OSTEOPOROSIS W/OUT CURRENT PATHOLOGICAL FRACTURE: ICD-10-CM

## 2024-06-13 LAB
24R-OH-CALCIDIOL SERPL-MCNC: 51.7 PG/ML
25(OH)D3 SERPL-MCNC: 85.9 NG/ML
ALBUMIN SERPL ELPH-MCNC: 4.3 G/DL
ALP BLD-CCNC: 68 U/L
ALT SERPL-CCNC: 173 U/L
ANION GAP SERPL CALC-SCNC: 10 MMOL/L
AST SERPL-CCNC: 153 U/L
BILIRUB SERPL-MCNC: 0.3 MG/DL
BUN SERPL-MCNC: 14 MG/DL
CALCIUM SERPL-MCNC: 9.9 MG/DL
CALCIUM SERPL-MCNC: 9.9 MG/DL
CHLORIDE SERPL-SCNC: 99 MMOL/L
CO2 SERPL-SCNC: 29 MMOL/L
CREAT SERPL-MCNC: 0.81 MG/DL
EGFR: 80 ML/MIN/1.73M2
GLUCOSE SERPL-MCNC: 91 MG/DL
MAGNESIUM SERPL-MCNC: 2 MG/DL
PARATHYROID HORMONE INTACT: 24 PG/ML
PHOSPHATE SERPL-MCNC: 4.1 MG/DL
POTASSIUM SERPL-SCNC: 4.8 MMOL/L
PROT SERPL-MCNC: 7.9 G/DL
SODIUM SERPL-SCNC: 137 MMOL/L
TSH SERPL-ACNC: 1 UIU/ML

## 2024-06-13 PROCEDURE — G0444 DEPRESSION SCREEN ANNUAL: CPT | Mod: 59

## 2024-06-13 PROCEDURE — G2211 COMPLEX E/M VISIT ADD ON: CPT

## 2024-06-13 PROCEDURE — 99215 OFFICE O/P EST HI 40 MIN: CPT

## 2024-06-13 RX ORDER — ZOLEDRONIC ACID 5 MG/100ML
5 INJECTION, SOLUTION INTRAVENOUS
Qty: 1 | Refills: 0 | Status: ACTIVE | COMMUNITY
Start: 2024-06-13

## 2024-06-13 RX ORDER — VITAMIN K2 90 MCG
125 MCG CAPSULE ORAL
Refills: 0 | Status: DISCONTINUED | COMMUNITY
End: 2024-06-13

## 2024-06-17 LAB — COLLAGEN CTX SERPL-MCNC: 194 PG/ML

## 2024-06-19 ENCOUNTER — APPOINTMENT (OUTPATIENT)
Dept: GERIATRICS | Facility: CLINIC | Age: 69
End: 2024-06-19
Payer: MEDICARE

## 2024-06-19 VITALS
TEMPERATURE: 98 F | BODY MASS INDEX: 21.17 KG/M2 | DIASTOLIC BLOOD PRESSURE: 58 MMHG | HEIGHT: 64 IN | OXYGEN SATURATION: 98 % | HEART RATE: 57 BPM | WEIGHT: 124 LBS | SYSTOLIC BLOOD PRESSURE: 90 MMHG

## 2024-06-19 DIAGNOSIS — B96.89 ACUTE UPPER RESPIRATORY INFECTION, UNSPECIFIED: ICD-10-CM

## 2024-06-19 DIAGNOSIS — J06.9 ACUTE UPPER RESPIRATORY INFECTION, UNSPECIFIED: ICD-10-CM

## 2024-06-19 DIAGNOSIS — B96.89 CHRONIC SINUSITIS, UNSPECIFIED: ICD-10-CM

## 2024-06-19 DIAGNOSIS — J32.9 CHRONIC SINUSITIS, UNSPECIFIED: ICD-10-CM

## 2024-06-19 DIAGNOSIS — R79.89 OTHER SPECIFIED ABNORMAL FINDINGS OF BLOOD CHEMISTRY: ICD-10-CM

## 2024-06-19 PROCEDURE — 99214 OFFICE O/P EST MOD 30 MIN: CPT

## 2024-06-19 PROCEDURE — G2211 COMPLEX E/M VISIT ADD ON: CPT

## 2024-06-19 RX ORDER — CALCIUM CARBONATE/VITAMIN D3 600 MG-10
TABLET ORAL
Refills: 0 | Status: ACTIVE | COMMUNITY

## 2024-06-19 RX ORDER — ASCORBIC ACID 1000 MG
10 TABLET ORAL
Refills: 0 | Status: ACTIVE | COMMUNITY

## 2024-06-19 RX ORDER — GINGER ROOT/GINGER ROOT EXT 262.5 MG
600-800 CAPSULE ORAL
Refills: 0 | Status: ACTIVE | COMMUNITY

## 2024-06-19 RX ORDER — GINSENG 100 MG
CAPSULE ORAL
Refills: 0 | Status: ACTIVE | COMMUNITY

## 2024-06-19 RX ORDER — IPRATROPIUM BROMIDE 21 UG/1
0.03 SPRAY NASAL 3 TIMES DAILY
Qty: 1 | Refills: 3 | Status: ACTIVE | COMMUNITY
Start: 2024-06-19 | End: 1900-01-01

## 2024-06-19 RX ORDER — AZITHROMYCIN 250 MG/1
250 TABLET, FILM COATED ORAL
Qty: 1 | Refills: 0 | Status: ACTIVE | COMMUNITY
Start: 2024-06-19 | End: 1900-01-01

## 2024-06-19 RX ORDER — MULTIVIT-MIN/IRON/FOLIC ACID/K 18-600-40
400 CAPSULE ORAL
Refills: 0 | Status: ACTIVE | COMMUNITY

## 2024-06-19 NOTE — ASSESSMENT
[FreeTextEntry1] : wheezing on exam persistent headache and frontal pressure will treat with azithromycin now to consider reclast with endocrine LFTs rising will confirm non of OTC supplements are effecting has seen hepatologist at [facility] who advised immunosuppressive therapy that she refused encouraging second opinion at this time she has made an appointment for Sept

## 2024-06-19 NOTE — HISTORY OF PRESENT ILLNESS
[No falls in past year] : Patient reported no falls in the past year [0] : 2) Feeling down, depressed, or hopeless: Not at all (0) [FreeTextEntry1] : sick x 5 days waxing and waning cough  now with headache and dizziness no sick contact  home COVID testing negative LFTs rising again she has deferred on treatment from Dr Duffy - liver specialist at [facility]   [Completely Independent] : Completely independent. [Smoke Detector] : smoke detector [Carbon Monoxide Detector] : carbon monoxide detector [NO] : No [PHQ-2 Negative - No further assessment needed] : PHQ-2 Negative - No further assessment needed [[id number]] : 0

## 2024-06-19 NOTE — PHYSICAL EXAM
[Alert] : alert [Well Nourished] : well nourished [Well Developed] : well developed [Sclera] : the sclera and conjunctiva were normal [EOMI] : extraocular movements were intact [PERRL] : pupils were equal in size, round, and reactive to light [Normal Oral Mucosa] : normal oral mucosa [No Oral Pallor] : no oral pallor [No Respiratory Distress] : no respiratory distress [No Acc Muscle Use] : no accessory muscle use [Respiration, Rhythm And Depth] : normal respiratory rhythm and effort [Normal S1, S2] : normal S1 and S2 [Heart Sounds Gallop] : no gallops [No Rubs] : no pericardial rub [Bowel Sounds] : normal bowel sounds [Abdomen Tenderness] : non-tender [Abdomen Soft] : soft [No CVA Tenderness] : no CVA  tenderness [No Spinal Tenderness] : no spinal tenderness [Normal Color / Pigmentation] : normal skin color and pigmentation [] : no rash [de-identified] : no enlarged lymphnodes [de-identified] : vertical abdominal scar noted [de-identified] : exp wheezing

## 2024-06-22 NOTE — END OF VISIT
[FreeTextEntry3] : All medical record entries made by the Scribe were at my, SHADY BOOKER, direction and personally dictated by me on 6/13/2024. I have reviewed the chart and agree that the record accurately reflects my personal performance of the history, physical exam, assessment and plan. I have also personally directed, reviewed, and agreed with the chart.   Documented by Kulwinder Brown acting as a scribe for SHADY BOOKER on 6/13/2024. [Time Spent: ___ minutes] : I have spent [unfilled] minutes of time on the encounter.

## 2024-06-22 NOTE — REVIEW OF SYSTEMS
[Fatigue] : fatigue [Recent Weight Loss (___ Lbs)] : recent weight loss: [unfilled] lbs [Palpitations] : palpitations [Polyuria] : polyuria [Myalgia] : myalgia  [Negative] : Heme/Lymph [Cough] : no cough [Heartburn] : no heartburn [FreeTextEntry2] : on gluten free diet helps a lot per pt [FreeTextEntry3] : hoarseness  [FreeTextEntry5] : seen Cardiology 10yrs ago last timeonce in a while per pt  [FreeTextEntry7] : blood in the urine seen Urology will have Cystoscopy  [de-identified] : very low BP per pt fainting in the past per pt

## 2024-06-22 NOTE — ASSESSMENT
[FreeTextEntry1] : 5min spent on depression sreening [Little interest or pleasure doing things] : 1) Little interest or pleasure doing things [Feeling down, depressed, or hopeless] : 2) Feeling down, depressed, or hopeless [0] : 2) Feeling down, depressed, or hopeless: Not at all (0) [[id number]] : 0

## 2024-06-22 NOTE — HISTORY OF PRESENT ILLNESS
[Family History of Osteoporosis] : family history of osteoporosis [FreeTextEntry1] : Ms. FARHAN ROSALES is a 67 year old female feeling more tired recently not a higher LFTs primary care also liver specialist at [facility] Seeing me today for osteoporosis on Actonel started July 2022 first treatment MRI for her osteoporosis not compliant with her calcium and vitamin D TAHBO at 50 yo benign mass  most recent March 2022 dental implant done in [place]  last DEXA 3/11/2021 LS -2.3 Femural neck -2.5  no calcium and Vit D   was told blood in the urine will see Urology test was 4/2021  on ETNA from her country for a dental implant 8/15/21 in [place] (trifosfato Trssodica de uridina 1.5mg 3 times a day per her dentist   6/13/24 osteo follow up- Stopped risendronate because the copay was too expensive.  Doing Ca 1200 mg+ D 1000 IU in addition to 5000 IU D.  Following with dentist regularly.  Needs to follow up with GI. Stopped following up with Dr. Mauricio and has not been compliant with liver med.   DEXA 5/2024 reviewed:  Osteopenia in spine with T-score of -2.1. +7.9% better.  Osteopenia in left hip with T-score of -1.3. +2.9% better. Osteoporosis in left forearm with T-score of -3.9. No comparison available. [Low Calcium Intake] : no low calcium intake [Low Vit D Intake/Exposure] : no low vitamin D intake [Premat. Menopause (natural)] : no history of premature menopause [Premat. Menopause (surgery)] : no history of premature surgical menopause [Amenorrhea] : no amenorrhea [Disordered Eating] : no history of disordered eating [Taking Steroids] : no history of taking steroids [Hyperparathyroidism] : no history of hyperparathyroidism [Diabetes] : no history of diabetes [Kidney Stones] : no history of kidney stones [Family History of Breast Cancer] : no family history of breast cancer [Family History of Hip Fracture] : no family history of hip fracture [History of Radiation Therapy] : no history of radiation therapy [History of Blood Clots] : no history of blood clots [High Fall Risk] : no fall risk [Frequent Falls] : no frequent falls [Uses a Walker/Cane] : no use of a walker/cane [Inability to Stand Straight] : no inability to stand straight [Loss of Height] : no loss of height [Loss of Balance] : no loss of balance [Change in Clothing Fit] : no change in clothing fit [Weight Gain] : no weight gain [Difficulty Ambulating] : no difficulty ambulating [Hip Pain] : no hip pain [Wrist Pain] : no wrist pain [Difficulty with Stairs] : no difficulty with stairs [Arthralgias] : no arthralgias [Myalgias] : no myalgias [New Fracture] : no new fracture

## 2024-06-22 NOTE — PHYSICAL EXAM
[Alert] : alert [Well Nourished] : well nourished [No Acute Distress] : no acute distress [Well Developed] : well developed [Normal Sclera/Conjunctiva] : normal sclera/conjunctiva [EOMI] : extra ocular movement intact [No Proptosis] : no proptosis [Normal Oropharynx] : the oropharynx was normal [No Thyroid Nodules] : no palpable thyroid nodules [No Respiratory Distress] : no respiratory distress [No Accessory Muscle Use] : no accessory muscle use [No Edema] : no peripheral edema [Pedal Pulses Normal] : the pedal pulses are present [Normal Bowel Sounds] : normal bowel sounds [Not Tender] : non-tender [Not Distended] : not distended [Soft] : abdomen soft [Normal Anterior Cervical Nodes] : no anterior cervical lymphadenopathy [No Spinal Tenderness] : no spinal tenderness [Spine Straight] : spine straight [No Stigmata of Cushings Syndrome] : no stigmata of Cushings Syndrome [Normal Gait] : normal gait [Normal Strength/Tone] : muscle strength and tone were normal [No Rash] : no rash [Normal Reflexes] : deep tendon reflexes were 2+ and symmetric [No Tremors] : no tremors [Oriented x3] : oriented to person, place, and time [Acanthosis Nigricans] : no acanthosis nigricans [de-identified] : Looks tired

## 2024-06-25 ENCOUNTER — NON-APPOINTMENT (OUTPATIENT)
Age: 69
End: 2024-06-25

## 2024-06-25 ENCOUNTER — APPOINTMENT (OUTPATIENT)
Dept: OPHTHALMOLOGY | Facility: CLINIC | Age: 69
End: 2024-06-25
Payer: SELF-PAY

## 2024-06-25 ENCOUNTER — APPOINTMENT (OUTPATIENT)
Dept: OPHTHALMOLOGY | Facility: CLINIC | Age: 69
End: 2024-06-25
Payer: MEDICARE

## 2024-06-25 LAB
INFLUENZA A RESULT: NOT DETECTED
INFLUENZA B RESULT: NOT DETECTED
RESP SYN VIRUS RESULT: NOT DETECTED
SARS-COV-2 RESULT: NOT DETECTED

## 2024-06-25 PROCEDURE — 92134 CPTRZ OPH DX IMG PST SGM RTA: CPT

## 2024-06-25 PROCEDURE — 76514 ECHO EXAM OF EYE THICKNESS: CPT

## 2024-06-25 PROCEDURE — 92015 DETERMINE REFRACTIVE STATE: CPT

## 2024-06-25 PROCEDURE — 92020 GONIOSCOPY: CPT

## 2024-06-25 PROCEDURE — 92004 COMPRE OPH EXAM NEW PT 1/>: CPT

## 2024-07-02 ENCOUNTER — RESULT REVIEW (OUTPATIENT)
Age: 69
End: 2024-07-02

## 2024-09-09 ENCOUNTER — APPOINTMENT (OUTPATIENT)
Dept: GERIATRICS | Facility: CLINIC | Age: 69
End: 2024-09-09
Payer: MEDICARE

## 2024-09-09 VITALS
HEIGHT: 64 IN | BODY MASS INDEX: 21.51 KG/M2 | DIASTOLIC BLOOD PRESSURE: 60 MMHG | HEART RATE: 76 BPM | TEMPERATURE: 98.2 F | OXYGEN SATURATION: 98 % | SYSTOLIC BLOOD PRESSURE: 92 MMHG | WEIGHT: 126 LBS

## 2024-09-09 DIAGNOSIS — F41.9 ANXIETY DISORDER, UNSPECIFIED: ICD-10-CM

## 2024-09-09 DIAGNOSIS — F32.A ANXIETY DISORDER, UNSPECIFIED: ICD-10-CM

## 2024-09-09 PROCEDURE — G2211 COMPLEX E/M VISIT ADD ON: CPT

## 2024-09-09 PROCEDURE — 99215 OFFICE O/P EST HI 40 MIN: CPT

## 2024-09-09 NOTE — ASSESSMENT
[FreeTextEntry1] : will bring in MOLST next visit to renew Now working with hepatologist Dr. Corral in [facility] but about to have second opinion at [facility] Did have Shingrix #1 which means she is slowly changing her opinion about medicine I suspect she is getting more comfortable in the practice will have Shingrix #2 in 2-6 months also to follow with endocrine regarding reclast infusions  breast exam unrevealing

## 2024-09-09 NOTE — PHYSICAL EXAM
[Alert] : alert [Well Nourished] : well nourished [Well Developed] : well developed [Sclera] : the sclera and conjunctiva were normal [EOMI] : extraocular movements were intact [PERRL] : pupils were equal in size, round, and reactive to light [Normal Oral Mucosa] : normal oral mucosa [No Oral Pallor] : no oral pallor [No Respiratory Distress] : no respiratory distress [No Acc Muscle Use] : no accessory muscle use [Respiration, Rhythm And Depth] : normal respiratory rhythm and effort [Normal S1, S2] : normal S1 and S2 [Heart Sounds Gallop] : no gallops [Normal Appearance] : normal in appearance [No Rubs] : no pericardial rub [Breast Palpation Mass] : no palpable masses [No Nipple Discharge] : no nipple discharge [Bowel Sounds] : normal bowel sounds [Abdomen Tenderness] : non-tender [Abdomen Soft] : soft [No CVA Tenderness] : no CVA  tenderness [No Spinal Tenderness] : no spinal tenderness [Normal Color / Pigmentation] : normal skin color and pigmentation [] : no rash [de-identified] : vertical abdominal scar noted

## 2024-09-09 NOTE — HISTORY OF PRESENT ILLNESS
[FreeTextEntry1] : LFTs rising again  she has deferred on treatment from Dr Duffy - liver specialist at [facility] states she is feeling fine and cannot understand needing to take immunosuppressive therapy or steroids at this time discussed cirrhosis and liver failure which is why she is open to a second opinion at [facility] apparently she has also had biopsy and work up with doctors in [place]  Had first Shingrix shot, very hesitant to vaccines but starting to accept more care as she gets more comfortable in my practice     [No falls in past year] : Patient reported no falls in the past year [Completely Independent] : Completely independent. [Smoke Detector] : smoke detector [Carbon Monoxide Detector] : carbon monoxide detector [NO] : No [0] : 1) Little interest or pleasure doing things: Not at all (0) [1] : 2) Feeling down, depressed, or hopeless for several days (1) [PHQ-2 Negative - No further assessment needed] : PHQ-2 Negative - No further assessment needed [[id number]] : 1

## 2024-09-12 ENCOUNTER — APPOINTMENT (OUTPATIENT)
Dept: HEPATOLOGY | Facility: CLINIC | Age: 69
End: 2024-09-12
Payer: MEDICARE

## 2024-09-12 VITALS
SYSTOLIC BLOOD PRESSURE: 99 MMHG | DIASTOLIC BLOOD PRESSURE: 59 MMHG | HEART RATE: 81 BPM | WEIGHT: 125 LBS | BODY MASS INDEX: 21.6 KG/M2 | OXYGEN SATURATION: 98 % | HEIGHT: 63.75 IN

## 2024-09-12 DIAGNOSIS — N39.0 URINARY TRACT INFECTION, SITE NOT SPECIFIED: ICD-10-CM

## 2024-09-12 DIAGNOSIS — Z83.79 FAMILY HISTORY OF OTHER DISEASES OF THE DIGESTIVE SYSTEM: ICD-10-CM

## 2024-09-12 DIAGNOSIS — R06.02 SHORTNESS OF BREATH: ICD-10-CM

## 2024-09-12 DIAGNOSIS — K75.4 AUTOIMMUNE HEPATITIS: ICD-10-CM

## 2024-09-12 DIAGNOSIS — A07.8 OTHER SPECIFIED PROTOZOAL INTESTINAL DISEASES: ICD-10-CM

## 2024-09-12 DIAGNOSIS — K76.89 OTHER SPECIFIED DISEASES OF LIVER: ICD-10-CM

## 2024-09-12 DIAGNOSIS — Z83.49 FAMILY HISTORY OF OTHER ENDOCRINE, NUTRITIONAL AND METABOLIC DISEASES: ICD-10-CM

## 2024-09-12 DIAGNOSIS — D17.71 BENIGN LIPOMATOUS NEOPLASM OF KIDNEY: ICD-10-CM

## 2024-09-12 DIAGNOSIS — R93.89 ABNORMAL FINDINGS ON DIAGNOSTIC IMAGING OF OTHER SPECIFIED BODY STRUCTURES: ICD-10-CM

## 2024-09-12 DIAGNOSIS — Z86.79 PERSONAL HISTORY OF OTHER DISEASES OF THE CIRCULATORY SYSTEM: ICD-10-CM

## 2024-09-12 DIAGNOSIS — M81.0 AGE-RELATED OSTEOPOROSIS W/OUT CURRENT PATHOLOGICAL FRACTURE: ICD-10-CM

## 2024-09-12 DIAGNOSIS — J30.89 OTHER ALLERGIC RHINITIS: ICD-10-CM

## 2024-09-12 DIAGNOSIS — R74.01 ELEVATION OF LEVELS OF LIVER TRANSAMINASE LEVELS: ICD-10-CM

## 2024-09-12 DIAGNOSIS — Z86.19 PERSONAL HISTORY OF OTHER INFECTIOUS AND PARASITIC DISEASES: ICD-10-CM

## 2024-09-12 PROCEDURE — G2211 COMPLEX E/M VISIT ADD ON: CPT

## 2024-09-12 PROCEDURE — 99214 OFFICE O/P EST MOD 30 MIN: CPT

## 2024-09-12 NOTE — ASSESSMENT
[FreeTextEntry1] : Ms. ROSALES is a 68-year-old woman from [place] with anxiety and depression, hypotension, GERD, osteoporosis, frequent headaches, resected uterus tumor 2006, mesh repair of an abdominal hernia, and a diagnosis of autoimmune hepatitis with elevated LFTs since 2006. She saw a hepatologist, Dr. Prieto ([facility]), but wishes to transfer her care because of her insurance and as her other doctors are at [facility].  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 Mauricio  Imaging findings reviewed with the patient.  Plan: - bloodwork today, return in 2-3 weeks - US abdomen - she will bring records from Dr. Prieto including records of imaging and her liver biopsies - recommend acetaminophen up to 2g/d for headache for now, avoid diclofenac

## 2024-09-12 NOTE — HISTORY OF PRESENT ILLNESS
[FreeTextEntry1] : Ms. ROSALES is a 68-year-old woman orig. from [place], who was referred by her PCP, Dr. Posadas because of chronic liver disease. She sees a hepatologist at [facility], Dr. Prieto, but would like to switch to our practice because of her insurance and as her other doctors are here. Records from [facility] are not available.  Elevated liver enzymes were found in 2006 when a 10 lbs uterus tumor was removed in [place]. She then did not have health insurance, got a liver biopsy in [place] in 2015 and was told that she had autoimmune liver disease, but did not take medications as she was afraid of side effects. Around 2021, she started seeing Dr. Prieto and got a 2nd liver biopsy, which reportedly confirmed AIH. She stopped immunosuppressants after 3 months and only took prednisone again briefly in early 2024, again out of concern about side effects. She denies ever having had significant fatigue, abdominal pain, pruritus, and yellow eyes. She feels fine overall, but reports an aversion to eggs and greasy food since 7/2024 and stopped eating eggs for breakfast. Denies weight loss, can eat meat without problems. Has burping and heartburn, states she has GERD and a hiatal hernia, sees Dr. Mauricio.  She eats a gluten-free diet although she does not have Celiac disease, which one of her three daughters has. She took antibiotics in 2023 for at UTI, and in early 2024 for a sinus infection. Takes diclofenac once a week for headache - gets it OTC from [place]. Last liver imaging was 1-2 years ago.  Our EMR shows elevated AST/ALT since 2020 - mostly elevated, but normal in 1/2024 after she briefly took prednisone, and elevated again in 6/2024: AST//173. PLT >200 in 2/2024.  PMH: hypotension, GERD, osteoporosis, frequent headaches FHx: one of three daughters has celiac disease, two other daughters have Hashimoto thyroiditis Alcohol history: past little use, recently none because of stomach upset Weight history: 125 lbs, BMI 21.6 in 9/2024.  Remedies/OTC meds: as in med list   ROS: Constitutional: no fever, fatigue, weight gain/loss. Eyes: no eye pain or discharge. ENT: no nosebleed, earache, change in hearing. CVS: denies chest pain, palpitations, claudication, irregular heartbeat. Resp: denies SOB, wheezing, cough, SOB on exertion. GI: denies abdominal pain, vomiting, diarrhea, heartburn, melena. Genitourinary: denies dysuria, incontinence. Musculoskeletal: denies joint pain, joint stiffness. Integumentary: denies pruritus, skin lesions, rash. Neuro: denies confusion, dizziness, fainting. Psych: denies anxiety, depression, change in personality. Endo: denies muscle weakness. Heme/lymph: denies easy bleeding and bruising.   Test results:  - 6/5/18 US abdomen: liver cyst R lobe 4.6 cm (was 2.1 cm in 2011). GB adenomyomatosis. L kidney cyst 8.3 cm and presumed angiomyolipoma 5.2 cm. - colonoscopy:   Physical exam: Gen: A&Ox3, NAD HEENT: normal outer ears & nose, PERRL, mucus membranes moist, anicteric, no lymphadenopathy CVS: regular rate and rhythm, no murmur Pulm: vesicular breath sounds Abdomen: normal bowel sounds, soft, nontender, no hepatosplenomegaly Legs: no edema, no clubbing Musculoskeletal: normal gait Skin: no rash Neuro: no asterixis, EOMI Psych: normal affect

## 2024-09-13 LAB
AFP-TM SERPL-MCNC: <1.8 NG/ML
BASOPHILS # BLD AUTO: 0.09 K/UL
BASOPHILS NFR BLD AUTO: 1.4 %
DEPRECATED KAPPA LC FREE/LAMBDA SER: 1.49 RATIO
EOSINOPHIL # BLD AUTO: 0.89 K/UL
EOSINOPHIL NFR BLD AUTO: 13.4 %
FERRITIN SERPL-MCNC: 76 NG/ML
HBV CORE IGG+IGM SER QL: NONREACTIVE
HBV SURFACE AB SER QL: NONREACTIVE
HBV SURFACE AG SER QL: NONREACTIVE
HCT VFR BLD CALC: 41.4 %
HCV AB SER QL: NONREACTIVE
HCV S/CO RATIO: 0.19 S/CO
HEPATITIS A IGG ANTIBODY: REACTIVE
HGB BLD-MCNC: 12.9 G/DL
IGA SER QL IEP: 328 MG/DL
IGG SER QL IEP: 2229 MG/DL
IGM SER QL IEP: 445 MG/DL
IMM GRANULOCYTES NFR BLD AUTO: 0.2 %
INR PPP: 1.01 RATIO
IRON SATN MFR SERPL: 38 %
IRON SERPL-MCNC: 134 UG/DL
KAPPA LC CSF-MCNC: 2.53 MG/DL
KAPPA LC SERPL-MCNC: 3.77 MG/DL
LYMPHOCYTES # BLD AUTO: 1.8 K/UL
LYMPHOCYTES NFR BLD AUTO: 27.2 %
MAN DIFF?: NORMAL
MCHC RBC-ENTMCNC: 30.3 PG
MCHC RBC-ENTMCNC: 31.2 GM/DL
MCV RBC AUTO: 97.2 FL
MONOCYTES # BLD AUTO: 1 K/UL
MONOCYTES NFR BLD AUTO: 15.1 %
NEUTROPHILS # BLD AUTO: 2.83 K/UL
NEUTROPHILS NFR BLD AUTO: 42.7 %
PLATELET # BLD AUTO: 150 K/UL
PT BLD: 11.4 SEC
RBC # BLD: 4.26 M/UL
RBC # FLD: 13.2 %
TIBC SERPL-MCNC: 352 UG/DL
UIBC SERPL-MCNC: 218 UG/DL
WBC # FLD AUTO: 6.62 K/UL

## 2024-09-14 LAB
ALBUMIN SERPL ELPH-MCNC: 4.3 G/DL
ALP BLD-CCNC: 74 U/L
ALT SERPL-CCNC: 192 U/L
ANION GAP SERPL CALC-SCNC: 17 MMOL/L
AST SERPL-CCNC: 161 U/L
BILIRUB SERPL-MCNC: 0.4 MG/DL
BUN SERPL-MCNC: 16 MG/DL
CALCIUM SERPL-MCNC: 9.8 MG/DL
CHLORIDE SERPL-SCNC: 100 MMOL/L
CO2 SERPL-SCNC: 21 MMOL/L
CREAT SERPL-MCNC: 0.64 MG/DL
EGFR: 97 ML/MIN/1.73M2
GLUCOSE SERPL-MCNC: 76 MG/DL
POTASSIUM SERPL-SCNC: 4.7 MMOL/L
PROT SERPL-MCNC: 8.5 G/DL
SODIUM SERPL-SCNC: 138 MMOL/L

## 2024-09-18 LAB
ANA SER IF-ACNC: NEGATIVE
MITOCHONDRIA AB SER IF-ACNC: NORMAL
SMOOTH MUSCLE AB SER QL IF: NORMAL

## 2024-09-20 LAB
A1AT PHENOTYP SERPL-IMP: NORMAL
A1AT SERPL-MCNC: 148 MG/DL

## 2024-09-21 LAB
TPMT ENZYME INTERPRETATION: NORMAL
TPMT ENZYME METHODOLOGY: NORMAL
TPMT ENZYME: 17.9

## 2024-09-24 DIAGNOSIS — M81.0 AGE-RELATED OSTEOPOROSIS W/OUT CURRENT PATHOLOGICAL FRACTURE: ICD-10-CM

## 2024-09-26 LAB
25(OH)D3 SERPL-MCNC: 50.1 NG/ML
ALBUMIN SERPL ELPH-MCNC: 4.1 G/DL
ALP BLD-CCNC: 63 U/L
ALT SERPL-CCNC: 172 U/L
ANION GAP SERPL CALC-SCNC: 10 MMOL/L
AST SERPL-CCNC: 141 U/L
BILIRUB SERPL-MCNC: 0.5 MG/DL
BUN SERPL-MCNC: 15 MG/DL
CALCIUM SERPL-MCNC: 9.5 MG/DL
CALCIUM SERPL-MCNC: 9.5 MG/DL
CHLORIDE SERPL-SCNC: 101 MMOL/L
CO2 SERPL-SCNC: 28 MMOL/L
CREAT SERPL-MCNC: 0.64 MG/DL
EGFR: 97 ML/MIN/1.73M2
GLUCOSE SERPL-MCNC: 84 MG/DL
PARATHYROID HORMONE INTACT: 35 PG/ML
POTASSIUM SERPL-SCNC: 4.4 MMOL/L
PROT SERPL-MCNC: 8.3 G/DL
SODIUM SERPL-SCNC: 139 MMOL/L

## 2024-10-01 ENCOUNTER — RESULT REVIEW (OUTPATIENT)
Age: 69
End: 2024-10-01

## 2024-10-03 ENCOUNTER — APPOINTMENT (OUTPATIENT)
Dept: HEPATOLOGY | Facility: CLINIC | Age: 69
End: 2024-10-03
Payer: MEDICARE

## 2024-10-03 VITALS
BODY MASS INDEX: 21.6 KG/M2 | SYSTOLIC BLOOD PRESSURE: 96 MMHG | WEIGHT: 125 LBS | DIASTOLIC BLOOD PRESSURE: 54 MMHG | HEIGHT: 63.75 IN | OXYGEN SATURATION: 95 % | HEART RATE: 76 BPM

## 2024-10-03 DIAGNOSIS — K75.4 AUTOIMMUNE HEPATITIS: ICD-10-CM

## 2024-10-03 DIAGNOSIS — R74.01 ELEVATION OF LEVELS OF LIVER TRANSAMINASE LEVELS: ICD-10-CM

## 2024-10-03 DIAGNOSIS — K74.00 HEPATIC FIBROSIS, UNSPECIFIED: ICD-10-CM

## 2024-10-03 DIAGNOSIS — D72.10 EOSINOPHILIA, UNSPECIFIED: ICD-10-CM

## 2024-10-03 DIAGNOSIS — D17.71 BENIGN LIPOMATOUS NEOPLASM OF KIDNEY: ICD-10-CM

## 2024-10-03 PROCEDURE — 99215 OFFICE O/P EST HI 40 MIN: CPT

## 2024-10-03 PROCEDURE — G2211 COMPLEX E/M VISIT ADD ON: CPT

## 2024-10-04 LAB
HIV1+2 AB SPEC QL IA.RAPID: NONREACTIVE
IGG SER QL IEP: 2058 MG/DL
IGG4 SER-MCNC: 10 MG/DL

## 2024-10-07 LAB — LKM AB SER QL IF: <20.1 UNITS

## 2024-10-08 LAB — STRONGYLOIDES AB SER IA-ACNC: NEGATIVE

## 2024-10-09 LAB
ANA PAT FLD IF-IMP: ABNORMAL
ANA SER IF-ACNC: ABNORMAL
SMOOTH MUSCLE AB SER QL IF: ABNORMAL

## 2024-10-10 ENCOUNTER — RX RENEWAL (OUTPATIENT)
Age: 69
End: 2024-10-10

## 2024-10-10 ENCOUNTER — NON-APPOINTMENT (OUTPATIENT)
Age: 69
End: 2024-10-10

## 2024-10-10 RX ORDER — PREDNISONE 10 MG/1
10 TABLET ORAL
Qty: 30 | Refills: 1 | Status: ACTIVE | COMMUNITY
Start: 2024-10-10 | End: 1900-01-01

## 2024-10-10 RX ORDER — PREDNISONE 10 MG/1
10 TABLET ORAL
Qty: 34 | Refills: 0 | Status: ACTIVE | COMMUNITY
Start: 2007-10-10 | End: 1900-01-01

## 2024-10-10 RX ORDER — PREDNISONE 10 MG/1
10 TABLET ORAL
Qty: 77 | Refills: 0 | Status: ACTIVE | COMMUNITY
Start: 2024-10-10 | End: 1900-01-01

## 2024-10-10 RX ORDER — AZATHIOPRINE 75 MG/1
75 TABLET ORAL DAILY
Qty: 90 | Refills: 2 | Status: DISCONTINUED | COMMUNITY
Start: 2024-10-10 | End: 2024-10-10

## 2024-10-10 NOTE — ASSESSMENT
[FreeTextEntry1] : Ms. ROSALES is a 69-year-old woman from [place] with anxiety and depression, hypotension, GERD, osteoporosis, frequent headaches, resected uterus tumor 2006, mesh repair of an abdominal hernia, and a diagnosis of autoimmune hepatitis with elevated LFTs since 2006. She saw a hepatologist, Dr. Prieto at [facility] and transferred her care in September of 2024 because of her insurance and as her other doctors are at [facility].  # reported autoimmune hepatitis - not on treatment due to anxiety about medication side effects - diagnostic workup is still somewhat inconclusive: negative AP, ASMA, and AMA in 9/2024, elevated IgG 2200    mg/dL in 9/2024 - chronic AST/ALT elevation ~150/170, most recently in 9/2024; normal values in 2/2024 reportedly after prednisone    that was prescribed in 8/2024 and taken only for 1 month - apparently soon after that.  - negative serologies for hepatitis B, C, A1AT deficiency, iron overload. TPMT testing normal. - liver biopsies in 2019 and 2023 show features of AIH and DILI, only mild fibrotic expansion of portal tracts.   Liver biopsy in 2019 (report in [place]): no plasma cells reported. Biopsy 2023: some plasma cells. Both show        eosinophils suggestive of poss. DILI. - medications: fluoxetine since 2022, Zoledronic acid one a year since 2024. She stopped taking supplements in 2018 - PLT >200 G/L in 2024  - asymptomatic, no exam findings of chronic liver disease; non-obese, no significant alcohol use, no pruritus, diarrhea, skin problems. - imaging: US abdomen 2024: hepatic cyst 4.6 cm. Liver and spleen otherwise normal. Small renal cysts and L angiomyolipoma - harmless - FHx of autoimmune diseases: Celiac disease in a daughter, Hashimoto thyroiditis in two other daughters  # frequent headaches, takes diclofenac OTC once a week - can cause drug-induced liver injury (DILI) including autoimmune DILI  GI: Dr. Mauricio  Medications and remedies reviewed again - she had persistent liver enzymes elevation after she stopped OTC remedies and was not taking any prescription medications in 2019. I discussed that she may have atypical autoimmune hepatitis as only IgG is elevated, but not typical autoantibodies. No significant fibrosis in 2023 despite >5y yrs of transaminase elevation >150 U/L.  Plan: - eosinophilia: strongyloides Ab, HIV test, anti-LKM, anti-SLA - she was instructed to all in 5 days - may start prednisone 20 mg/d and azathioprine - return in 2 months after bloodwork - she will print the report of her first liver biopsy - recommend acetaminophen up to 2g/d for headache for now, avoid diclofenac

## 2024-10-10 NOTE — HISTORY OF PRESENT ILLNESS
[FreeTextEntry1] : - 10/3/24: returns after bloodwork. Brought print of liver biopsy 2019 in [place], shows report of 8/14/23 liver biopsy on her computer. Feels good, never had significant diarrhea. Tells me she took Bactrim MWF for 2 weeks, and then prednisone and azathioprine for 1 month after prescribed in 8/2023 - cannot tell me when exactly. AST/ALT normal in our EMR in 2/2024. Took acetaminophen once yesterday as premedication for zoledronic first annual injection. No NSAIDs. Labs 9/12/24: Abnormal: AST//192, eosinophils 14.4%. Normal: WBC 6.62,  low-normal, INR 1.01  - 9/12/24: Ms. ROSALES is a 68-year-old woman orig. from [place], who was referred by her PCP, Dr. Posadas because of chronic liver disease. She sees a hepatologist at [facility], Dr. Prieto, but would like to switch to our practice because of her insurance and as her other doctors are here. Records from [facility] are not available.  Elevated liver enzymes were found in 2006 when a 10 lbs uterus tumor was removed in [place]. She then did not have health insurance, got a liver biopsy in [place] in 2015 and was told that she had autoimmune liver disease, but did not take medications as she was afraid of side effects. Around 2021, she started seeing Dr. Prieto and got a 2nd liver biopsy, which reportedly confirmed AIH. She stopped immunosuppressants after 3 months and only took prednisone again briefly in early 2024, again out of concern about side effects. She denies ever having had significant fatigue, abdominal pain, pruritus, and yellow eyes. She feels fine overall, but reports an aversion to eggs and greasy food since 7/2024 and stopped eating eggs for breakfast. Denies weight loss, can eat meat without problems. Has burping and heartburn, states she has GERD and a hiatal hernia, sees Dr. Mauricio.  She eats a gluten-free diet although she does not have Celiac disease, which one of her three daughters has. She took antibiotics in 2023 for at UTI, and in early 2024 for a sinus infection. Takes diclofenac once a week for headache - gets it OTC from [place]. Last liver imaging was 1-2 years ago. Our EMR shows elevated AST/ALT since 2020 - mostly elevated, but normal in 1/2024 after she briefly took prednisone, and elevated again in 6/2024: AST//173. PLT >200 in 2/2024.  PMH: hypotension, GERD, osteoporosis, frequent headaches FHx: one of three daughters has celiac disease, two other daughters have Hashimoto thyroiditis Alcohol history: past little use, recently none because of stomach upset Weight history: 125 lbs, BMI 21.6 in 9/2024.  Remedies/OTC meds: as in med list   Test results: - 8/7/23 liver biopsy WBC (seen on her computer on 10/3/24): over 20 portal tracts, moderately dense chronic inflammation, prodeom. lymphocytes, occasional plasma cells, rare eosinophils. Patchy mild interface activity, moderate lobular inflammation; a few areas with pericentral inflammation. Portal inflammation in some areas with nodular aggregates with duct injury. Trichrome: slightly expanded portal tracts with fibrosis. Comment: DD AIH and DILI. Given the positive AP and elevated IgG, AIH is favored.  Some duct injury with periductal inflammation - PBC should be excluded. HEV is also in the differential.  - 10/1/24 US abdomen: liver morphology normal. Hepatic cyst 4.4 cm unchanged. CBD, gallbladder, and spleen normal. Kidney cysts 1.4 and 1.1 cm,    left renal angiomyolipoma 7 mm. - 9/14/24: TPMT enzyme normal 17.9. - 9/12/24: AP negative, ASMA <1:20, IgG 2229, AMA <1:20, IgM elevated 445 mg/dL, IgA normal.  A1AT MM, normal ferritin and iron panel. HAV IgG positive. HBsAg, sAb, cAb negative, HCV Ab negative. AFP <1.8 ng/mL - 9/12/19 biopsia hepatica por agulha: hepatite cronica em moderada atividade, com moderada eosinfilia protal e lobular associada. Expansao fibrosa dos espacos antwan (estadio estrutural 1). Hepatotoxicidade medicamentosa ou de outra etiologia, hepatite auto-imune. Recomenda ... sugere-se afastar parasitose - 6/5/18 US abdomen: liver cyst R lobe 4.6 cm (was 2.1 cm in 2011). GB adenomyomatosis. L kidney cyst 8.3 cm and presumed angiomyolipoma 5.2 cm. - colonoscopy:

## 2024-10-10 NOTE — ASSESSMENT
[FreeTextEntry1] : Ms. ROSALES is a 69-year-old woman from [place] with anxiety and depression, hypotension, GERD, osteoporosis, frequent headaches, resected uterus tumor 2006, mesh repair of an abdominal hernia, and a diagnosis of autoimmune hepatitis with elevated LFTs since 2006. She saw a hepatologist, Dr. Prieto at [facility] and transferred her care in September of 2024 because of her insurance and as her other doctors are at [facility].  # reported autoimmune hepatitis - not on treatment due to anxiety about medication side effects - diagnostic workup is still somewhat inconclusive: negative AP, ASMA, and AMA in 9/2024, elevated IgG 2200    mg/dL in 9/2024 - chronic AST/ALT elevation ~150/170, most recently in 9/2024; normal values in 2/2024 reportedly after prednisone    that was prescribed in 8/2024 and taken only for 1 month - apparently soon after that.  - negative serologies for hepatitis B, C, A1AT deficiency, iron overload. TPMT testing normal. - liver biopsies in 2019 and 2023 show features of AIH and DILI, only mild fibrotic expansion of portal tracts.   Liver biopsy in 2019 (report in [place]): no plasma cells reported. Biopsy 2023: some plasma cells. Both show        eosinophils suggestive of poss. DILI. - medications: fluoxetine since 2022, Zoledronic acid one a year since 2024. She stopped taking supplements in 2018 - PLT >200 G/L in 2024  - asymptomatic, no exam findings of chronic liver disease; non-obese, no significant alcohol use, no pruritus, diarrhea, skin problems. - imaging: US abdomen 2024: hepatic cyst 4.6 cm. Liver and spleen otherwise normal. Small renal cysts and L angiomyolipoma - harmless - FHx of autoimmune diseases: Celiac disease in a daughter, Hashimoto thyroiditis in two other daughters  # frequent headaches, takes diclofenac OTC once a week - can cause drug-induced liver injury (DILI) including autoimmune DILI  GI: Dr. Mauricio  Medications and remedies reviewed again - she had persistent liver enzymes elevation after she stopped OTC remedies and was not taking any prescription medications in 2019. I discussed that she may have atypical autoimmune hepatitis as only IgG is elevated, but not typical autoantibodies. No significant fibrosis in 2023 despite >5y yrs of transaminase elevation >150 U/L.  Plan: - eosinophilia: strongyloides Ab, HIV test, anti-LKM, anti-SLA - she was instructed to all in 5 days - may start prednisone 20 mg/d and azathioprine - return in 2 months after bloodwork - she will print the report of her first liver biopsy - recommend acetaminophen up to 2g/d for headache for now, avoid diclofenac

## 2024-10-10 NOTE — HISTORY OF PRESENT ILLNESS
[FreeTextEntry1] : - 10/3/24: returns after bloodwork. Brought print of liver biopsy 2019 in [place], shows report of 8/14/23 liver biopsy on her computer. Feels good, never had significant diarrhea. Tells me she took Bactrim MWF for 2 weeks, and then prednisone and azathioprine for 1 month after prescribed in 8/2023 - cannot tell me when exactly. AST/ALT normal in our EMR in 2/2024. Took acetaminophen once yesterday as premedication for zoledronic first annual injection. No NSAIDs. Labs 9/12/24: Abnormal: AST//192, eosinophils 14.4%. Normal: WBC 6.62,  low-normal, INR 1.01  - 9/12/24: Ms. ROSALES is a 68-year-old woman orig. from [place], who was referred by her PCP, Dr. Posadas because of chronic liver disease. She sees a hepatologist at [facility], Dr. Prieto, but would like to switch to our practice because of her insurance and as her other doctors are here. Records from [facility] are not available.  Elevated liver enzymes were found in 2006 when a 10 lbs uterus tumor was removed in [place]. She then did not have health insurance, got a liver biopsy in [place] in 2015 and was told that she had autoimmune liver disease, but did not take medications as she was afraid of side effects. Around 2021, she started seeing Dr. Prieto and got a 2nd liver biopsy, which reportedly confirmed AIH. She stopped immunosuppressants after 3 months and only took prednisone again briefly in early 2024, again out of concern about side effects. She denies ever having had significant fatigue, abdominal pain, pruritus, and yellow eyes. She feels fine overall, but reports an aversion to eggs and greasy food since 7/2024 and stopped eating eggs for breakfast. Denies weight loss, can eat meat without problems. Has burping and heartburn, states she has GERD and a hiatal hernia, sees Dr. Mauricio.  She eats a gluten-free diet although she does not have Celiac disease, which one of her three daughters has. She took antibiotics in 2023 for at UTI, and in early 2024 for a sinus infection. Takes diclofenac once a week for headache - gets it OTC from [place]. Last liver imaging was 1-2 years ago. Our EMR shows elevated AST/ALT since 2020 - mostly elevated, but normal in 1/2024 after she briefly took prednisone, and elevated again in 6/2024: AST//173. PLT >200 in 2/2024.  PMH: hypotension, GERD, osteoporosis, frequent headaches FHx: one of three daughters has celiac disease, two other daughters have Hashimoto thyroiditis Alcohol history: past little use, recently none because of stomach upset Weight history: 125 lbs, BMI 21.6 in 9/2024.  Remedies/OTC meds: as in med list   Test results: - 8/7/23 liver biopsy WBC (seen on her computer on 10/3/24): over 20 portal tracts, moderately dense chronic inflammation, prodeom. lymphocytes, occasional plasma cells, rare eosinophils. Patchy mild interface activity, moderate lobular inflammation; a few areas with pericentral inflammation. Portal inflammation in some areas with nodular aggregates with duct injury. Trichrome: slightly expanded portal tracts with fibrosis. Comment: DD AIH and DILI. Given the positive AP and elevated IgG, AIH is favored.  Some duct injury with periductal inflammation - PBC should be excluded. HEV is also in the differential.  - 10/1/24 US abdomen: liver morphology normal. Hepatic cyst 4.4 cm unchanged. CBD, gallbladder, and spleen normal. Kidney cysts 1.4 and 1.1 cm,    left renal angiomyolipoma 7 mm. - 9/14/24: TPMT enzyme normal 17.9. - 9/12/24: AP negative, ASMA <1:20, IgG 2229, AMA <1:20, IgM elevated 445 mg/dL, IgA normal.  A1AT MM, normal ferritin and iron panel. HAV IgG positive. HBsAg, sAb, cAb negative, HCV Ab negative. AFP <1.8 ng/mL - 9/12/19 biopsia hepatica por agulha: hepatite cronica em moderada atividade, com moderada eosinfilia protal e lobular associada. Expansao fibrosa dos espacos antwan (estadio estrutural 1). Hepatotoxicidade medicamentosa ou de outra etiologia, hepatite auto-imune. Recomenda ... sugere-se afastar parasitose - 6/5/18 US abdomen: liver cyst R lobe 4.6 cm (was 2.1 cm in 2011). GB adenomyomatosis. L kidney cyst 8.3 cm and presumed angiomyolipoma 5.2 cm. - colonoscopy:

## 2024-10-11 ENCOUNTER — NON-APPOINTMENT (OUTPATIENT)
Age: 69
End: 2024-10-11

## 2024-10-11 LAB — SOLUBLE LIVER IGG SER IA-ACNC: 225.6

## 2024-10-11 RX ORDER — AZATHIOPRINE 50 1/1
50 TABLET ORAL DAILY
Qty: 90 | Refills: 1 | Status: ACTIVE | COMMUNITY
Start: 2024-10-10 | End: 1900-01-01

## 2024-11-15 ENCOUNTER — APPOINTMENT (OUTPATIENT)
Dept: GERIATRICS | Facility: CLINIC | Age: 69
End: 2024-11-15
Payer: MEDICARE

## 2024-11-15 VITALS
TEMPERATURE: 98 F | DIASTOLIC BLOOD PRESSURE: 60 MMHG | SYSTOLIC BLOOD PRESSURE: 104 MMHG | WEIGHT: 124 LBS | HEART RATE: 85 BPM | OXYGEN SATURATION: 99 % | BODY MASS INDEX: 21.45 KG/M2

## 2024-11-15 DIAGNOSIS — M81.0 AGE-RELATED OSTEOPOROSIS W/OUT CURRENT PATHOLOGICAL FRACTURE: ICD-10-CM

## 2024-11-15 DIAGNOSIS — F32.A ANXIETY DISORDER, UNSPECIFIED: ICD-10-CM

## 2024-11-15 DIAGNOSIS — F41.9 ANXIETY DISORDER, UNSPECIFIED: ICD-10-CM

## 2024-11-15 DIAGNOSIS — K75.4 AUTOIMMUNE HEPATITIS: ICD-10-CM

## 2024-11-15 PROCEDURE — G2211 COMPLEX E/M VISIT ADD ON: CPT

## 2024-11-15 PROCEDURE — 99214 OFFICE O/P EST MOD 30 MIN: CPT

## 2024-12-16 ENCOUNTER — APPOINTMENT (OUTPATIENT)
Facility: CLINIC | Age: 69
End: 2024-12-16
Payer: MEDICARE

## 2024-12-16 ENCOUNTER — NON-APPOINTMENT (OUTPATIENT)
Age: 69
End: 2024-12-16

## 2024-12-16 PROCEDURE — 92012 INTRM OPH EXAM EST PATIENT: CPT

## 2024-12-16 PROCEDURE — 92083 EXTENDED VISUAL FIELD XM: CPT

## 2024-12-19 ENCOUNTER — APPOINTMENT (OUTPATIENT)
Dept: HEPATOLOGY | Facility: CLINIC | Age: 69
End: 2024-12-19
Payer: MEDICARE

## 2024-12-19 VITALS
WEIGHT: 124 LBS | SYSTOLIC BLOOD PRESSURE: 98 MMHG | HEIGHT: 63 IN | BODY MASS INDEX: 21.97 KG/M2 | HEART RATE: 71 BPM | OXYGEN SATURATION: 96 % | DIASTOLIC BLOOD PRESSURE: 70 MMHG

## 2024-12-19 DIAGNOSIS — H26.9 UNSPECIFIED CATARACT: ICD-10-CM

## 2024-12-19 DIAGNOSIS — R74.01 ELEVATION OF LEVELS OF LIVER TRANSAMINASE LEVELS: ICD-10-CM

## 2024-12-19 DIAGNOSIS — K74.00 HEPATIC FIBROSIS, UNSPECIFIED: ICD-10-CM

## 2024-12-19 DIAGNOSIS — K75.4 AUTOIMMUNE HEPATITIS: ICD-10-CM

## 2024-12-19 PROCEDURE — G2211 COMPLEX E/M VISIT ADD ON: CPT

## 2024-12-19 PROCEDURE — 99214 OFFICE O/P EST MOD 30 MIN: CPT

## 2025-01-08 ENCOUNTER — APPOINTMENT (OUTPATIENT)
Facility: CLINIC | Age: 70
End: 2025-01-08

## 2025-02-10 ENCOUNTER — LABORATORY RESULT (OUTPATIENT)
Age: 70
End: 2025-02-10

## 2025-02-10 DIAGNOSIS — R30.0 DYSURIA: ICD-10-CM

## 2025-02-11 LAB
APPEARANCE: CLEAR
BILIRUBIN URINE: NEGATIVE
BLOOD URINE: ABNORMAL
COLOR: YELLOW
GLUCOSE QUALITATIVE U: NEGATIVE MG/DL
KETONES URINE: NEGATIVE MG/DL
LEUKOCYTE ESTERASE URINE: ABNORMAL
NITRITE URINE: NEGATIVE
PH URINE: 7
PROTEIN URINE: NEGATIVE MG/DL
SPECIFIC GRAVITY URINE: 1.01
UROBILINOGEN URINE: 0.2 MG/DL

## 2025-02-13 ENCOUNTER — RX RENEWAL (OUTPATIENT)
Age: 70
End: 2025-02-13

## 2025-02-13 ENCOUNTER — LABORATORY RESULT (OUTPATIENT)
Age: 70
End: 2025-02-13

## 2025-02-14 DIAGNOSIS — N39.0 URINARY TRACT INFECTION, SITE NOT SPECIFIED: ICD-10-CM

## 2025-02-14 RX ORDER — NITROFURANTOIN (MONOHYDRATE/MACROCRYSTALS) 25; 75 MG/1; MG/1
100 CAPSULE ORAL
Qty: 10 | Refills: 0 | Status: ACTIVE | COMMUNITY
Start: 2025-02-14 | End: 1900-01-01

## 2025-02-25 ENCOUNTER — LABORATORY RESULT (OUTPATIENT)
Age: 70
End: 2025-02-25

## 2025-02-25 ENCOUNTER — APPOINTMENT (OUTPATIENT)
Dept: GERIATRICS | Facility: CLINIC | Age: 70
End: 2025-02-25
Payer: MEDICARE

## 2025-02-25 VITALS
OXYGEN SATURATION: 96 % | HEIGHT: 63 IN | DIASTOLIC BLOOD PRESSURE: 58 MMHG | BODY MASS INDEX: 22.15 KG/M2 | SYSTOLIC BLOOD PRESSURE: 98 MMHG | WEIGHT: 125 LBS | HEART RATE: 70 BPM | TEMPERATURE: 98.1 F

## 2025-02-25 DIAGNOSIS — N39.0 URINARY TRACT INFECTION, SITE NOT SPECIFIED: ICD-10-CM

## 2025-02-25 DIAGNOSIS — R30.0 DYSURIA: ICD-10-CM

## 2025-02-25 PROCEDURE — 99213 OFFICE O/P EST LOW 20 MIN: CPT

## 2025-02-26 LAB
APPEARANCE: CLEAR
BILIRUBIN URINE: NEGATIVE
BLOOD URINE: ABNORMAL
COLOR: YELLOW
GLUCOSE QUALITATIVE U: NEGATIVE MG/DL
KETONES URINE: NEGATIVE MG/DL
LEUKOCYTE ESTERASE URINE: ABNORMAL
NITRITE URINE: NEGATIVE
PH URINE: 7.5
PROTEIN URINE: NEGATIVE MG/DL
SPECIFIC GRAVITY URINE: 1.01
UROBILINOGEN URINE: 0.2 MG/DL

## 2025-02-27 ENCOUNTER — APPOINTMENT (OUTPATIENT)
Dept: HEPATOLOGY | Facility: CLINIC | Age: 70
End: 2025-02-27

## 2025-03-03 ENCOUNTER — NON-APPOINTMENT (OUTPATIENT)
Age: 70
End: 2025-03-03

## 2025-03-06 ENCOUNTER — NON-APPOINTMENT (OUTPATIENT)
Age: 70
End: 2025-03-06

## 2025-03-17 ENCOUNTER — APPOINTMENT (OUTPATIENT)
Dept: GERIATRICS | Facility: CLINIC | Age: 70
End: 2025-03-17
Payer: MEDICARE

## 2025-03-17 VITALS
DIASTOLIC BLOOD PRESSURE: 58 MMHG | WEIGHT: 126 LBS | BODY MASS INDEX: 22.32 KG/M2 | OXYGEN SATURATION: 95 % | TEMPERATURE: 98.7 F | HEART RATE: 93 BPM | HEIGHT: 63 IN | SYSTOLIC BLOOD PRESSURE: 100 MMHG

## 2025-03-17 DIAGNOSIS — N60.19 DIFFUSE CYSTIC MASTOPATHY OF UNSPECIFIED BREAST: ICD-10-CM

## 2025-03-17 DIAGNOSIS — N39.3 STRESS INCONTINENCE (FEMALE) (MALE): ICD-10-CM

## 2025-03-17 DIAGNOSIS — K75.4 AUTOIMMUNE HEPATITIS: ICD-10-CM

## 2025-03-17 DIAGNOSIS — Z12.31 ENCOUNTER FOR SCREENING MAMMOGRAM FOR MALIGNANT NEOPLASM OF BREAST: ICD-10-CM

## 2025-03-17 DIAGNOSIS — M81.0 AGE-RELATED OSTEOPOROSIS W/OUT CURRENT PATHOLOGICAL FRACTURE: ICD-10-CM

## 2025-03-17 PROCEDURE — G0439: CPT

## 2025-03-25 ENCOUNTER — APPOINTMENT (OUTPATIENT)
Dept: ENDOCRINOLOGY | Facility: CLINIC | Age: 70
End: 2025-03-25

## 2025-06-26 ENCOUNTER — APPOINTMENT (OUTPATIENT)
Facility: CLINIC | Age: 70
End: 2025-06-26

## 2025-08-09 ENCOUNTER — NON-APPOINTMENT (OUTPATIENT)
Age: 70
End: 2025-08-09

## 2025-08-11 ENCOUNTER — APPOINTMENT (OUTPATIENT)
Dept: GERIATRICS | Facility: CLINIC | Age: 70
End: 2025-08-11
Payer: MEDICARE

## 2025-08-11 VITALS
HEART RATE: 82 BPM | OXYGEN SATURATION: 95 % | BODY MASS INDEX: 21.97 KG/M2 | SYSTOLIC BLOOD PRESSURE: 100 MMHG | WEIGHT: 124 LBS | DIASTOLIC BLOOD PRESSURE: 62 MMHG | TEMPERATURE: 96.3 F

## 2025-08-11 DIAGNOSIS — F32.A ANXIETY DISORDER, UNSPECIFIED: ICD-10-CM

## 2025-08-11 DIAGNOSIS — F41.9 ANXIETY DISORDER, UNSPECIFIED: ICD-10-CM

## 2025-08-11 DIAGNOSIS — R52 PAIN, UNSPECIFIED: ICD-10-CM

## 2025-08-11 DIAGNOSIS — R05.1 ACUTE COUGH: ICD-10-CM

## 2025-08-11 DIAGNOSIS — R68.83 CHILLS (WITHOUT FEVER): ICD-10-CM

## 2025-08-11 DIAGNOSIS — M81.0 AGE-RELATED OSTEOPOROSIS W/OUT CURRENT PATHOLOGICAL FRACTURE: ICD-10-CM

## 2025-08-11 DIAGNOSIS — K75.4 AUTOIMMUNE HEPATITIS: ICD-10-CM

## 2025-08-11 LAB
FLU PANEL AND COVID: NEGATIVE
INFLUENZA A RESULT: NEGATIVE
INFLUENZA RESULT: NEGATIVE
SARS-COV-2 RESULT: NEGATIVE

## 2025-08-11 PROCEDURE — 99214 OFFICE O/P EST MOD 30 MIN: CPT

## 2025-08-21 ENCOUNTER — APPOINTMENT (OUTPATIENT)
Dept: GERIATRICS | Facility: CLINIC | Age: 70
End: 2025-08-21